# Patient Record
Sex: FEMALE | Race: WHITE | NOT HISPANIC OR LATINO | Employment: OTHER | URBAN - METROPOLITAN AREA
[De-identification: names, ages, dates, MRNs, and addresses within clinical notes are randomized per-mention and may not be internally consistent; named-entity substitution may affect disease eponyms.]

---

## 2017-11-06 ENCOUNTER — TELEPHONE (OUTPATIENT)
Dept: MEDICAL GROUP | Facility: MEDICAL CENTER | Age: 57
End: 2017-11-06

## 2017-11-06 ENCOUNTER — OFFICE VISIT (OUTPATIENT)
Dept: MEDICAL GROUP | Facility: MEDICAL CENTER | Age: 57
End: 2017-11-06
Payer: MEDICARE

## 2017-11-06 ENCOUNTER — HOSPITAL ENCOUNTER (OUTPATIENT)
Dept: RADIOLOGY | Facility: MEDICAL CENTER | Age: 57
End: 2017-11-06
Attending: NURSE PRACTITIONER
Payer: MEDICARE

## 2017-11-06 VITALS
DIASTOLIC BLOOD PRESSURE: 64 MMHG | TEMPERATURE: 98.1 F | HEIGHT: 67 IN | BODY MASS INDEX: 15.85 KG/M2 | OXYGEN SATURATION: 98 % | HEART RATE: 86 BPM | RESPIRATION RATE: 16 BRPM | WEIGHT: 101 LBS | SYSTOLIC BLOOD PRESSURE: 108 MMHG

## 2017-11-06 DIAGNOSIS — Z12.39 SCREENING FOR BREAST CANCER: ICD-10-CM

## 2017-11-06 DIAGNOSIS — R05.9 COUGH: ICD-10-CM

## 2017-11-06 DIAGNOSIS — M54.50 CHRONIC MIDLINE LOW BACK PAIN WITHOUT SCIATICA: ICD-10-CM

## 2017-11-06 DIAGNOSIS — R63.4 WEIGHT LOSS: ICD-10-CM

## 2017-11-06 DIAGNOSIS — Z72.0 TOBACCO ABUSE: ICD-10-CM

## 2017-11-06 DIAGNOSIS — Z90.710 S/P HYSTERECTOMY: ICD-10-CM

## 2017-11-06 DIAGNOSIS — Z12.11 SCREEN FOR COLON CANCER: ICD-10-CM

## 2017-11-06 DIAGNOSIS — R05.3 CHRONIC COUGH: ICD-10-CM

## 2017-11-06 DIAGNOSIS — G89.29 CHRONIC MIDLINE LOW BACK PAIN WITHOUT SCIATICA: ICD-10-CM

## 2017-11-06 PROCEDURE — 99203 OFFICE O/P NEW LOW 30 MIN: CPT | Performed by: NURSE PRACTITIONER

## 2017-11-06 PROCEDURE — 71020 DX-CHEST-2 VIEWS: CPT

## 2017-11-06 RX ORDER — ESTRADIOL 1 MG/1
1 TABLET ORAL DAILY
Qty: 30 TAB | Status: SHIPPED | DISCHARGE
Start: 2017-11-06 | End: 2018-11-21

## 2017-11-06 RX ORDER — IBUPROFEN 600 MG/1
600 TABLET ORAL DAILY
COMMUNITY

## 2017-11-06 RX ORDER — GABAPENTIN 300 MG/1
300 CAPSULE ORAL
Status: ON HOLD | DISCHARGE
Start: 2017-11-06 | End: 2021-03-05

## 2017-11-06 RX ORDER — TIZANIDINE HYDROCHLORIDE 2 MG/1
2 CAPSULE, GELATIN COATED ORAL NIGHTLY PRN
Status: ON HOLD | COMMUNITY
End: 2021-03-05

## 2017-11-06 ASSESSMENT — PATIENT HEALTH QUESTIONNAIRE - PHQ9: CLINICAL INTERPRETATION OF PHQ2 SCORE: 0

## 2017-11-06 ASSESSMENT — ENCOUNTER SYMPTOMS: WEIGHT LOSS: 1

## 2017-11-06 NOTE — PROGRESS NOTES
Subjective:      Cassandra Bartlett is a 57 y.o. female who presents with New Patient            HPI Cassandra Bartlett Is here today to establish care and for weight loss concerns.      1. Weight loss  Patient reports she has been low weight for all of her life and when she becomes stressed that she tends to lose more. Today in the office her BMI is 15 and she states she has lost about 10 pounds in the last 2 months. She thinks it is related to the stress of her sister recently passing away and taking care of her ill and elderly mother. She states she tends to eat very little when she is stressed. She has no particular area of concern but is a long time tobacco user.    2. Chronic midline low back pain without sciatica  Patient states she goes to pain management which is prescribing her muscle relaxers and low-dose narcotics for chronic back pain. She also occasionally gets epidural injections. She reports there has been no change in status and she does have titanium rods in her back from previous surgery.    3. S/P hysterectomy  Patient on hormone replacement through her gynecologist because of history of hysterectomy.    4. Cough  When questioned on cough patient states she has a mild cough which she relates secondary to her smoking. She denies hemoptysis, fever, sore throat or chills.    5. Screen for colon cancer  Patient believes it has been 10 years since her last colonoscopy.    6. Screening for breast cancer  It appears her last mammogram was 2012. She has not been going to a PCP in a while.    7. Tobacco abuse  Patient reports a 30 year history of pack-a-day smoking with occasional stopping in between.  Social History   Substance Use Topics   • Smoking status: Current Every Day Smoker     Packs/day: 1.00     Years: 30.00     Types: Cigarettes   • Smokeless tobacco: Never Used      Comment: 1 PPD   • Alcohol use No     Current Outpatient Prescriptions   Medication Sig Dispense Refill   • ibuprofen  "(MOTRIN) 600 MG Tab Take 600 mg by mouth as needed.     • tizanidine (ZANAFLEX) 2 MG capsule Take 2 mg by mouth as needed.     • estradiol (ESTRACE) 1 MG Tab Take 1 Tab by mouth every day. 30 Tab    • gabapentin (NEURONTIN) 300 MG Cap Take 1 Cap by mouth every bedtime.     • hydrocodone-acetaminophen (NORCO) 5-325 MG TABS per tablet Take 1-2 Tabs by mouth every four hours as needed.       No current facility-administered medications for this visit.    History reviewed. No pertinent past medical history.   Family History   Problem Relation Age of Onset   • Psychiatry Mother    • Hyperlipidemia Mother    • Cancer Father      asbestos exposure   • Cancer Sister        Review of Systems   Constitutional: Positive for weight loss.   All other systems reviewed and are negative.         Objective:     /64   Pulse 86   Temp 36.7 °C (98.1 °F)   Resp 16   Ht 1.702 m (5' 7\")   Wt 45.8 kg (101 lb)   SpO2 98%   BMI 15.82 kg/m²      Physical Exam   Constitutional: She is oriented to person, place, and time. She appears well-developed and well-nourished. No distress.   HENT:   Head: Normocephalic and atraumatic.   Right Ear: External ear normal.   Left Ear: External ear normal.   Nose: Nose normal.   Eyes: Right eye exhibits no discharge. Left eye exhibits no discharge.   Neck: Normal range of motion. Neck supple. No thyromegaly present.   Cardiovascular: Normal rate, regular rhythm and normal heart sounds.  Exam reveals no gallop and no friction rub.    No murmur heard.  Pulmonary/Chest: Effort normal and breath sounds normal. She has no wheezes. She has no rales.   Musculoskeletal: She exhibits no edema or tenderness.   Neurological: She is alert and oriented to person, place, and time. She displays normal reflexes.   Skin: Skin is warm and dry. No rash noted. She is not diaphoretic.   Psychiatric: She has a normal mood and affect. Her behavior is normal. Judgment and thought content normal.   Nursing note and " vitals reviewed.              Assessment/Plan:     1. Weight loss  Patient feels her weight loss is secondary to stress but I am concerned that her BMI is only at 15. I spoke with her about Ensure and other supplements as well as eating at least 3 good meals a day. She does not wish to go to counseling. I will do some basic lab work and a chest x-ray today. I would like her to have screening through the lung cancer screening program as well. I told her if the weight loss continues and she returns in 4 weeks she may need a CT of the chest, abdomen and pelvis.  - COMP METABOLIC PANEL; Future  - CBC WITH DIFFERENTIAL; Future  - TSH; Future    2. Chronic midline low back pain without sciatica  Patient currently goes to pain management and her medication list was updated and no medicines prescribed today.  - gabapentin (NEURONTIN) 300 MG Cap; Take 1 Cap by mouth every bedtime.    3. S/P hysterectomy  The medication below is coming through gynecology. She reports no history of DVT.  - estradiol (ESTRACE) 1 MG Tab; Take 1 Tab by mouth every day.  Dispense: 30 Tab    4. Cough  I would like to get a chest x-ray today and if it is normal C if she is eligible for CT scanning through the lung program.  - DX-CHEST-2 VIEWS; Future    5. Screen for colon cancer    - REFERRAL TO GI FOR COLONOSCOPY    6. Screening for breast cancer    - MA-SCREEN MAMMO W/CAD-BILAT; Future    7. Tobacco abuse    - REFERRAL TO LUNG CANCER SCREENING PROGRAM

## 2017-11-09 ENCOUNTER — TELEPHONE (OUTPATIENT)
Dept: HEMATOLOGY ONCOLOGY | Facility: MEDICAL CENTER | Age: 57
End: 2017-11-09

## 2017-11-09 NOTE — TELEPHONE ENCOUNTER
Received referral to lung cancer screening program.  Chart review to assess for lung cancer screening program eligibility.   1. Age 55-77 yrs of age? Yes 57 y.o.  2. 30 pack year hx of smoking, or greater? Yes 1 ppdx 30 yrs=  30 pkyr hx  3. Current smoker or if quit, has pt quit within last 15 yrs?Yes  Current smoker  4. Any signs or symptoms of lung cancer? None noted  5. Previous history of lung cancer? None noted  6. Chest CT within past 12 mos.? None noted  Patient does meet eligibility criteria. [facility] scheduling notified to schedule the shared decision making visit.

## 2018-01-03 ENCOUNTER — HOSPITAL ENCOUNTER (OUTPATIENT)
Dept: LAB | Facility: MEDICAL CENTER | Age: 58
End: 2018-01-03
Attending: NURSE PRACTITIONER
Payer: MEDICARE

## 2018-01-03 DIAGNOSIS — R63.4 WEIGHT LOSS: ICD-10-CM

## 2018-01-03 LAB
ALBUMIN SERPL BCP-MCNC: 3.7 G/DL (ref 3.2–4.9)
ALBUMIN/GLOB SERPL: 1.3 G/DL
ALP SERPL-CCNC: 54 U/L (ref 30–99)
ALT SERPL-CCNC: 9 U/L (ref 2–50)
ANION GAP SERPL CALC-SCNC: 3 MMOL/L (ref 0–11.9)
AST SERPL-CCNC: 17 U/L (ref 12–45)
BASOPHILS # BLD AUTO: 0.7 % (ref 0–1.8)
BASOPHILS # BLD: 0.06 K/UL (ref 0–0.12)
BILIRUB SERPL-MCNC: 0.4 MG/DL (ref 0.1–1.5)
BUN SERPL-MCNC: 12 MG/DL (ref 8–22)
CALCIUM SERPL-MCNC: 9 MG/DL (ref 8.5–10.5)
CHLORIDE SERPL-SCNC: 105 MMOL/L (ref 96–112)
CO2 SERPL-SCNC: 30 MMOL/L (ref 20–33)
CREAT SERPL-MCNC: 0.6 MG/DL (ref 0.5–1.4)
EOSINOPHIL # BLD AUTO: 0.11 K/UL (ref 0–0.51)
EOSINOPHIL NFR BLD: 1.3 % (ref 0–6.9)
ERYTHROCYTE [DISTWIDTH] IN BLOOD BY AUTOMATED COUNT: 43.7 FL (ref 35.9–50)
GFR SERPL CREATININE-BSD FRML MDRD: >60 ML/MIN/1.73 M 2
GLOBULIN SER CALC-MCNC: 2.9 G/DL (ref 1.9–3.5)
GLUCOSE SERPL-MCNC: 84 MG/DL (ref 65–99)
HCT VFR BLD AUTO: 45 % (ref 37–47)
HGB BLD-MCNC: 15 G/DL (ref 12–16)
IMM GRANULOCYTES # BLD AUTO: 0.05 K/UL (ref 0–0.11)
IMM GRANULOCYTES NFR BLD AUTO: 0.6 % (ref 0–0.9)
LYMPHOCYTES # BLD AUTO: 1.9 K/UL (ref 1–4.8)
LYMPHOCYTES NFR BLD: 22 % (ref 22–41)
MCH RBC QN AUTO: 29.8 PG (ref 27–33)
MCHC RBC AUTO-ENTMCNC: 33.3 G/DL (ref 33.6–35)
MCV RBC AUTO: 89.5 FL (ref 81.4–97.8)
MONOCYTES # BLD AUTO: 0.45 K/UL (ref 0–0.85)
MONOCYTES NFR BLD AUTO: 5.2 % (ref 0–13.4)
NEUTROPHILS # BLD AUTO: 6.05 K/UL (ref 2–7.15)
NEUTROPHILS NFR BLD: 70.2 % (ref 44–72)
NRBC # BLD AUTO: 0 K/UL
NRBC BLD-RTO: 0 /100 WBC
PLATELET # BLD AUTO: 243 K/UL (ref 164–446)
PMV BLD AUTO: 10.2 FL (ref 9–12.9)
POTASSIUM SERPL-SCNC: 4 MMOL/L (ref 3.6–5.5)
PROT SERPL-MCNC: 6.6 G/DL (ref 6–8.2)
RBC # BLD AUTO: 5.03 M/UL (ref 4.2–5.4)
SODIUM SERPL-SCNC: 138 MMOL/L (ref 135–145)
TSH SERPL DL<=0.005 MIU/L-ACNC: 0.98 UIU/ML (ref 0.38–5.33)
WBC # BLD AUTO: 8.6 K/UL (ref 4.8–10.8)

## 2018-01-03 PROCEDURE — 84443 ASSAY THYROID STIM HORMONE: CPT

## 2018-01-03 PROCEDURE — 80053 COMPREHEN METABOLIC PANEL: CPT

## 2018-01-03 PROCEDURE — 36415 COLL VENOUS BLD VENIPUNCTURE: CPT

## 2018-01-03 PROCEDURE — 85025 COMPLETE CBC W/AUTO DIFF WBC: CPT

## 2018-01-05 ENCOUNTER — OFFICE VISIT (OUTPATIENT)
Dept: MEDICAL GROUP | Facility: MEDICAL CENTER | Age: 58
End: 2018-01-05
Payer: MEDICARE

## 2018-01-05 VITALS
HEIGHT: 67 IN | HEART RATE: 119 BPM | WEIGHT: 101 LBS | BODY MASS INDEX: 15.85 KG/M2 | RESPIRATION RATE: 16 BRPM | SYSTOLIC BLOOD PRESSURE: 124 MMHG | OXYGEN SATURATION: 97 % | DIASTOLIC BLOOD PRESSURE: 66 MMHG | TEMPERATURE: 98.7 F

## 2018-01-05 DIAGNOSIS — J44.1 COPD EXACERBATION (HCC): ICD-10-CM

## 2018-01-05 DIAGNOSIS — Z72.0 TOBACCO ABUSE: ICD-10-CM

## 2018-01-05 DIAGNOSIS — J43.9 PULMONARY EMPHYSEMA, UNSPECIFIED EMPHYSEMA TYPE (HCC): ICD-10-CM

## 2018-01-05 PROCEDURE — 99214 OFFICE O/P EST MOD 30 MIN: CPT | Performed by: NURSE PRACTITIONER

## 2018-01-05 RX ORDER — AZITHROMYCIN 250 MG/1
TABLET, FILM COATED ORAL
Qty: 1 QUANTITY SUFFICIENT | Refills: 0 | Status: SHIPPED | OUTPATIENT
Start: 2018-01-05 | End: 2018-07-07

## 2018-01-05 RX ORDER — ALBUTEROL SULFATE 90 UG/1
2 AEROSOL, METERED RESPIRATORY (INHALATION) EVERY 6 HOURS PRN
Qty: 8.5 G | Refills: 11 | Status: SHIPPED | OUTPATIENT
Start: 2018-01-05 | End: 2019-05-21

## 2018-01-05 RX ORDER — METHYLPREDNISOLONE 4 MG/1
TABLET ORAL
Qty: 21 TAB | Refills: 0 | Status: SHIPPED | OUTPATIENT
Start: 2018-01-05 | End: 2018-07-07

## 2018-01-05 ASSESSMENT — ENCOUNTER SYMPTOMS
COUGH: 1
WHEEZING: 1

## 2018-01-05 NOTE — PROGRESS NOTES
Subjective:      Cassandra Bartlett is a 57 y.o. female who presents with Follow-Up (labs)    CC:Is here today to review lab work as well as new problem with cough and congestion.        HPI Cassandra Bartlett    1. COPD exacerbation (CMS-[facility])  Patient has possible COPD based on hyperinflation shown on chest x-rays but has never had pulmonary function studies. Her recent problem started about 1-1/2 weeks ago and everybody in her home has similar symptoms. She is complaining of cough, congestion, expectorating phlegm and wheezing. She does continue to smoke cigarettes. She denies recent fever, chills or myalgias. She did lab work just recently including chemistry panel, TSH and CBC which came back normal.    2. Pulmonary emphysema, unspecified emphysema type (CMS-[facility])  Patient has questionable history of COPD based on long-term smoking and chest x-ray report. She does smoke and does not have an inhaler to use. She would be willing to go see a pulmonologist.    3. Tobacco abuse  Patient did recently have work because of issues with weight loss although it does appear stable now. Her lab work is good and she states she has a colonoscopy in a few weeks. She also was supposed to get a CT of the lung for lung cancer screening but did not make an appointment.    Social History   Substance Use Topics   • Smoking status: Current Every Day Smoker     Packs/day: 1.00     Years: 30.00     Types: Cigarettes   • Smokeless tobacco: Never Used      Comment: 1 PPD   • Alcohol use No     Current Outpatient Prescriptions   Medication Sig Dispense Refill   • albuterol 108 (90 Base) MCG/ACT Aero Soln inhalation aerosol Inhale 2 Puffs by mouth every 6 hours as needed. 8.5 g 11   • azithromycin (ZITHROMAX Z-CASSANDRA) 250 MG Tab One Pack 1 Quantity Sufficient 0   • MethylPREDNISolone (MEDROL DOSEPAK) 4 MG Tablet Therapy Pack As directed on the packaging label. 21 Tab 0   • ibuprofen (MOTRIN) 600 MG Tab Take 600 mg by mouth as needed.     •  "tizanidine (ZANAFLEX) 2 MG capsule Take 2 mg by mouth as needed.     • estradiol (ESTRACE) 1 MG Tab Take 1 Tab by mouth every day. 30 Tab    • gabapentin (NEURONTIN) 300 MG Cap Take 1 Cap by mouth every bedtime.     • hydrocodone-acetaminophen (NORCO) 5-325 MG TABS per tablet Take 1-2 Tabs by mouth every four hours as needed.       No current facility-administered medications for this visit.      Family History   Problem Relation Age of Onset   • Psychiatry Mother    • Hyperlipidemia Mother    • Cancer Father      asbestos exposure   • Cancer Sister    History reviewed. No pertinent past medical history.    Review of Systems   HENT: Positive for congestion.    Respiratory: Positive for cough and wheezing.    All other systems reviewed and are negative.         Objective:     /66   Pulse (!) 119   Temp 37.1 °C (98.7 °F)   Resp 16   Ht 1.702 m (5' 7\")   Wt 45.8 kg (101 lb)   SpO2 97%   BMI 15.82 kg/m²      Physical Exam   Constitutional: She is oriented to person, place, and time. She appears well-developed and well-nourished. No distress.   HENT:   Head: Normocephalic and atraumatic.   Right Ear: External ear normal.   Left Ear: External ear normal.   Nose: Nose normal.   Eyes: Right eye exhibits no discharge. Left eye exhibits no discharge.   Neck: Normal range of motion. Neck supple. No thyromegaly present.   Cardiovascular: Normal rate, regular rhythm and normal heart sounds.  Exam reveals no gallop and no friction rub.    No murmur heard.  Pulmonary/Chest: Effort normal. She has wheezes. She has rhonchi. She has no rales.   Musculoskeletal: She exhibits no edema or tenderness.   Neurological: She is alert and oriented to person, place, and time. She displays normal reflexes.   Skin: Skin is warm and dry. No rash noted. She is not diaphoretic.   Psychiatric: She has a normal mood and affect. Her behavior is normal. Judgment and thought content normal.   Nursing note and vitals reviewed.       "   Component      Latest Ref Rng & Units 1/3/2018 1/3/2018 1/3/2018 1/3/2018           7:45 AM  7:45 AM  7:45 AM  7:45 AM   WBC      4.8 - 10.8 K/uL  8.6     RBC      4.20 - 5.40 M/uL  5.03     Hemoglobin      12.0 - 16.0 g/dL  15.0     Hematocrit      37.0 - 47.0 %  45.0     MCV      81.4 - 97.8 fL  89.5     MCH      27.0 - 33.0 pg  29.8     MCHC      33.6 - 35.0 g/dL  33.3 (L)     RDW      35.9 - 50.0 fL  43.7     Platelet Count      164 - 446 K/uL  243     MPV      9.0 - 12.9 fL  10.2     Neutrophils-Polys      44.00 - 72.00 %  70.20     Lymphocytes      22.00 - 41.00 %  22.00     Monocytes      0.00 - 13.40 %  5.20     Eosinophils      0.00 - 6.90 %  1.30     Basophils      0.00 - 1.80 %  0.70     Immature Granulocytes      0.00 - 0.90 %  0.60     Nucleated RBC      /100 WBC  0.00     Neutrophils (Absolute)      2.00 - 7.15 K/uL  6.05     Lymphs (Absolute)      1.00 - 4.80 K/uL  1.90     Monos (Absolute)      0.00 - 0.85 K/uL  0.45     Eos (Absolute)      0.00 - 0.51 K/uL  0.11     Baso (Absolute)      0.00 - 0.12 K/uL  0.06     Immature Granulocytes (abs)      0.00 - 0.11 K/uL  0.05     NRBC (Absolute)      K/uL  0.00     Sodium      135 - 145 mmol/L   138    Potassium      3.6 - 5.5 mmol/L   4.0    Chloride      96 - 112 mmol/L   105    Co2      20 - 33 mmol/L   30    Anion Gap      0.0 - 11.9   3.0    Glucose      65 - 99 mg/dL   84    Bun      8 - 22 mg/dL   12    Creatinine      0.50 - 1.40 mg/dL   0.60    Calcium      8.5 - 10.5 mg/dL   9.0    AST(SGOT)      12 - 45 U/L   17    ALT(SGPT)      2 - 50 U/L   9    Alkaline Phosphatase      30 - 99 U/L   54    Total Bilirubin      0.1 - 1.5 mg/dL   0.4    Albumin      3.2 - 4.9 g/dL   3.7    Total Protein      6.0 - 8.2 g/dL   6.6    Globulin      1.9 - 3.5 g/dL   2.9    A-G Ratio      g/dL   1.3    GFR If African American      >60 mL/min/1.73 m 2    >60   GFR If Non African American      >60 mL/min/1.73 m 2    >60   TSH      0.380 - 5.330 uIU/mL 0.980            Assessment/Plan:     1. COPD exacerbation (CMS-[facility])  Patient will start on a Z-Cassandra as well as a Medrol Dosepak. I also will give her albuterol to use for her wheezing. She is to follow back in the next week if no improvement for chest x-ray. She also is pending a lung CT.  - azithromycin (ZITHROMAX Z-CASSANDRA) 250 MG Tab; One Pack  Dispense: 1 Quantity Sufficient; Refill: 0  - MethylPREDNISolone (MEDROL DOSEPAK) 4 MG Tablet Therapy Pack; As directed on the packaging label.  Dispense: 21 Tab; Refill: 0    2. Pulmonary emphysema, unspecified emphysema type (CMS-HCC)  Patient with possible COPD and needs pulmonary function testing and referral to pulmonology place today. I will give her albuterol to try to see if this helps with symptoms. She has never been on a preventative inhaler but I would like to see a PFT first.   - REFERRAL TO PULMONOLOGY  - albuterol 108 (90 Base) MCG/ACT Aero Soln inhalation aerosol; Inhale 2 Puffs by mouth every 6 hours as needed.  Dispense: 8.5 g; Refill: 11    3. Tobacco abuse  I showed patient that she had been referred for low radiation lung CT because of her long history of smoking and she needs to contact the program to set up for her CT. I reviewed with her her lab work and she will do her colonoscopy in 2 weeks.  - REFERRAL TO PULMONOLOGY

## 2018-01-08 ENCOUNTER — OFFICE VISIT (OUTPATIENT)
Dept: HEMATOLOGY ONCOLOGY | Facility: MEDICAL CENTER | Age: 58
End: 2018-01-08
Payer: MEDICARE

## 2018-01-08 VITALS
BODY MASS INDEX: 16.51 KG/M2 | HEART RATE: 89 BPM | HEIGHT: 67 IN | TEMPERATURE: 99.1 F | OXYGEN SATURATION: 94 % | RESPIRATION RATE: 16 BRPM | WEIGHT: 105.16 LBS | SYSTOLIC BLOOD PRESSURE: 108 MMHG | DIASTOLIC BLOOD PRESSURE: 72 MMHG

## 2018-01-08 DIAGNOSIS — F17.210 CIGARETTE SMOKER: ICD-10-CM

## 2018-01-08 PROCEDURE — G0296 VISIT TO DETERM LDCT ELIG: HCPCS | Performed by: NURSE PRACTITIONER

## 2018-01-08 ASSESSMENT — ENCOUNTER SYMPTOMS
WHEEZING: 1
SHORTNESS OF BREATH: 0
HEMOPTYSIS: 0
WEIGHT LOSS: 1
COUGH: 1
SPUTUM PRODUCTION: 0

## 2018-01-08 ASSESSMENT — PAIN SCALES - GENERAL: PAINLEVEL: NO PAIN

## 2018-01-08 NOTE — PROGRESS NOTES
Subjective:      Cassandra Bartlett is a 57 y.o. female who presents for Lung Cancer Screening Program Prescreen (Nicotine dependence. Ref: Kimo Thacker) for lung cancer screening shared decision making visit.         HPI    Patient seen today for initial lung cancer screening visit. Patient referred by her PCP MICHA Busby.     The patient meets eligibility criteria including age, smoking history (30+ pack years), if former smoker, quit in the last 15 years, and absence of signs or symptoms of lung cancer.    - Age - 57  - Smoking history - Patient has smoked for 40 years at an average of 1 ppd = 40 pack year smoking history.  - Current smoking status - Current smoker. She is currently attempting to quit and is down to 1/2 ppd. However, she does get stressed easily and is having a harder time to quit.   - No symptoms of lung cancer and no previous history of lung cancer     Referral to pulmonary has been placed by PCP.     Allergies   Allergen Reactions   • Codeine      Current Outpatient Prescriptions on File Prior to Visit   Medication Sig Dispense Refill   • azithromycin (ZITHROMAX Z-CASSANDRA) 250 MG Tab One Pack 1 Quantity Sufficient 0   • albuterol 108 (90 Base) MCG/ACT Aero Soln inhalation aerosol Inhale 2 Puffs by mouth every 6 hours as needed. 8.5 g 11   • MethylPREDNISolone (MEDROL DOSEPAK) 4 MG Tablet Therapy Pack As directed on the packaging label. 21 Tab 0   • ibuprofen (MOTRIN) 600 MG Tab Take 600 mg by mouth as needed.     • tizanidine (ZANAFLEX) 2 MG capsule Take 2 mg by mouth as needed.     • estradiol (ESTRACE) 1 MG Tab Take 1 Tab by mouth every day. 30 Tab    • gabapentin (NEURONTIN) 300 MG Cap Take 1 Cap by mouth every bedtime.     • hydrocodone-acetaminophen (NORCO) 5-325 MG TABS per tablet Take 1-2 Tabs by mouth every four hours as needed.       No current facility-administered medications on file prior to visit.        Review of Systems   Constitutional: Positive for weight loss (a lot of  "stress and caring for sick mother. Attempting to gain weight. ). Negative for malaise/fatigue.   Respiratory: Positive for cough (residual cold with coughing. Otherwise coughing every once in a while. ) and wheezing (noticed recent wheezing by PCP from acute cold). Negative for hemoptysis, sputum production and shortness of breath (only with recent cold).         Recent cold and started on Z-stefany          Objective:     /72   Pulse 89   Temp 37.3 °C (99.1 °F)   Resp 16   Ht 1.702 m (5' 7\")   Wt 47.7 kg (105 lb 2.6 oz)   SpO2 94%   Breastfeeding? No   BMI 16.47 kg/m²      Physical Exam   Constitutional: She is oriented to person, place, and time. She appears well-developed and well-nourished. No distress.   HENT:   Head: Normocephalic and atraumatic.   Cardiovascular: Normal rate, regular rhythm and normal heart sounds.  Exam reveals no gallop and no friction rub.    No murmur heard.  Pulmonary/Chest: Effort normal. No respiratory distress. She has wheezes.   Inspiratory wheezing noted    Musculoskeletal: Normal range of motion.   Neurological: She is alert and oriented to person, place, and time.   Skin: Skin is warm and dry. She is not diaphoretic.   Vitals reviewed.              Assessment/Plan:     1. Cigarette smoker  CT-LUNG CANCER-SCREENING         We conducted a shared decision-making process using a decision aid. We reviewed benefits and harms of screening, including false positives and potential need for additional diagnostic testing, the possibility of over diagnosis, and total radiation exposure.    We discussed the importance of adhering to annual LDCT screening. We also discussed the impact of comorbities on the patient's the ability or willingness to undergo diagnostic procedure(s) and treatment.    Counseling on the importance of maintaining cigarette smoking abstinence if former smoker; or the importance of smoking cessation if current smoker and, if appropriate, furnishing of " information about tobacco cessation interventions. I provided patient with smoking cessation materials and resources within [facility] and the community. Patient appreciative of the resources.       Based on our discussion, we have decided to begin annual lung cancer screening starting now.

## 2018-01-09 ENCOUNTER — HOSPITAL ENCOUNTER (OUTPATIENT)
Dept: RADIOLOGY | Facility: MEDICAL CENTER | Age: 58
End: 2018-01-09
Attending: NURSE PRACTITIONER
Payer: MEDICARE

## 2018-01-09 DIAGNOSIS — F17.210 CIGARETTE SMOKER: ICD-10-CM

## 2018-01-09 PROCEDURE — G0297 LDCT FOR LUNG CA SCREEN: HCPCS

## 2018-01-12 ENCOUNTER — TELEPHONE (OUTPATIENT)
Dept: HEMATOLOGY ONCOLOGY | Facility: MEDICAL CENTER | Age: 58
End: 2018-01-12

## 2018-01-12 DIAGNOSIS — R91.8 LUNG NODULES: ICD-10-CM

## 2018-01-12 NOTE — TELEPHONE ENCOUNTER
"Phoned patient with results of LDCT exam performed on 1/9/2018 .  Notified her that the results showed \"multiple bilateral pulmonary nodules measuring up to 6mm with both solid and part solid nodules present.\" Informed her they are very small nodules that had a benign, or non cancer, appearance, however Ariadna Ramsey, APRN, would like to refer her to the lung nodule clinic to keep a closer eye on her.  Informed her the nodule clinic can determine if annual is appropriate or if repeat CT will be needed sooner.  Informed of incidental findings of \"bilateral apical pleural scarring and minimal bilateral dependent atelectatic changes.\" Provided pt education that she does not have any emergent findings.  Informed her that the pulmonary group will review these findings with her in detail when they follow up with her.  Patient agrees to all recommendations. Notified GARRY Busby, of results and recommendations via inbasket message.  Health maintenance updated and patient sent lung cancer screening result letter.    "

## 2018-02-13 ENCOUNTER — OFFICE VISIT (OUTPATIENT)
Dept: PULMONOLOGY | Facility: HOSPICE | Age: 58
End: 2018-02-13
Payer: MEDICARE

## 2018-02-13 VITALS
DIASTOLIC BLOOD PRESSURE: 72 MMHG | WEIGHT: 105 LBS | TEMPERATURE: 97.5 F | HEIGHT: 67 IN | RESPIRATION RATE: 16 BRPM | HEART RATE: 81 BPM | BODY MASS INDEX: 16.48 KG/M2 | OXYGEN SATURATION: 95 % | SYSTOLIC BLOOD PRESSURE: 122 MMHG

## 2018-02-13 DIAGNOSIS — R91.8 PULMONARY NODULES: ICD-10-CM

## 2018-02-13 DIAGNOSIS — Z72.0 TOBACCO USE: ICD-10-CM

## 2018-02-13 PROCEDURE — 99204 OFFICE O/P NEW MOD 45 MIN: CPT | Performed by: INTERNAL MEDICINE

## 2018-02-13 NOTE — PROGRESS NOTES
Chief Complaint   Patient presents with   • Establish Care     Referred by SHANNA Diehl for CT results       HPI: This patient is a 57 y.o. Female who is referred to lung nodule clinic for lung cancer screening CAT scan results. She has an estimated 40-pack-year history of tobacco use, currently smoking half a pack daily. She is motivated to quit smoking however admits to increased stressors at home which make it difficult for her to quit entirely. She has cut down from one pack daily. She had lost 15 pounds over the past year which she feels is related to her life stressors. She had a low dose lung cancer screening chest CAT scan on January 9, 2018 which was personally reviewed. This demonstrated scattered, bilateral , 5mm noncalcified pulmonary nodules. No infiltrates or lymphadenopathy appreciated. She denies dyspnea, cough, wheezing, hemoptysis or chest pain. She denies a personal history of malignancy. Denies history of pneumonia. She has had acute bronchitis and possible COPD, although formal diagnosis has not been made of COPD to date.    Past Medical History:   Diagnosis Date   • Back pain    • Chickenpox        Social History     Social History   • Marital status:      Spouse name: N/A   • Number of children: N/A   • Years of education: N/A     Occupational History   • Not on file.     Social History Main Topics   • Smoking status: Current Every Day Smoker     Packs/day: 1.00     Years: 40.00     Types: Cigarettes   • Smokeless tobacco: Never Used      Comment: 1 PPD   • Alcohol use No   • Drug use: No   • Sexual activity: Yes     Other Topics Concern   • Not on file     Social History Narrative   • No narrative on file       Family History   Problem Relation Age of Onset   • Psychiatry Mother    • Hyperlipidemia Mother    • Cancer Father      asbestos exposure   • Cancer Sister        Current Outpatient Prescriptions on File Prior to Visit   Medication Sig Dispense Refill   • ibuprofen  "(MOTRIN) 600 MG Tab Take 600 mg by mouth as needed.     • tizanidine (ZANAFLEX) 2 MG capsule Take 2 mg by mouth as needed.     • estradiol (ESTRACE) 1 MG Tab Take 1 Tab by mouth every day. 30 Tab    • gabapentin (NEURONTIN) 300 MG Cap Take 1 Cap by mouth every bedtime.     • hydrocodone-acetaminophen (NORCO) 5-325 MG TABS per tablet Take 1-2 Tabs by mouth every four hours as needed.     • albuterol 108 (90 Base) MCG/ACT Aero Soln inhalation aerosol Inhale 2 Puffs by mouth every 6 hours as needed. 8.5 g 11   • azithromycin (ZITHROMAX Z-CASSANDRA) 250 MG Tab One Pack 1 Quantity Sufficient 0   • MethylPREDNISolone (MEDROL DOSEPAK) 4 MG Tablet Therapy Pack As directed on the packaging label. 21 Tab 0     No current facility-administered medications on file prior to visit.        Allergies: Codeine    ROS:   Constitutional: Denies fevers, chills, night sweats, fatigue,+weight loss  Eyes: Denies vision loss, pain, drainage, double vision  Ears, Nose, Throat: Denies earache, difficulty hearing, tinnitus, nasal congestion, hoarseness  Cardiovascular: Denies chest pain, tightness, palpitations, orthopnea or edema  Respiratory: Denies shortness of breath, cough, wheezing, hemoptysis  Sleep: Denies daytime sleepiness, snoring, apneas, insomnia, morning headaches  GI: Denies heartburn, dysphagia, nausea, abdominal pain, diarrhea or constipation  : Denies frequent urination, hematuria, discharge or painful urination  Musculoskeletal: Denies back pain, painful joints, sore muscles  Neurological: Denies weakness or headaches  Skin: No rashes    Blood pressure 122/72, pulse 81, temperature 36.4 °C (97.5 °F), resp. rate 16, height 1.702 m (5' 7\"), weight 47.6 kg (105 lb), SpO2 95 %.    Physical Exam:  Appearance: Well-nourished, well-developed, thin, in no acute distress  HEENT: Normocephalic, atraumatic, white sclera, PERRLA, oropharynx clear  Neck: No adenopathy or masses  Respiratory: no intercostal retractions or accessory muscle " use  Lungs auscultation: Clear to auscultation bilaterally  Cardiovascular: Regular rate rhythm. No murmurs, rubs or gallops.  No LE edema  Abdomen: soft, nondistended  Gait: Normal  Digits: No clubbing, cyanosis  Motor: No focal deficits  Orientation: Oriented to time, person and place    Diagnosis:  1. Pulmonary nodules  CT-CHEST (THORAX) W/O    AMB PULMONARY FUNCTION TEST/LAB   2. Tobacco use         Plan:  The patient's lung cancer screening CAT scan shows scattered, multiple <5mm pulmonary nodules. She is an active smoker at half a pack daily with motivation to quit. She denies any respiratory symptomatology although has dropped weight.  Smoking cessation counseling was provided. We discussed that the nodules are likely postinflammatory, however as smoking is the #1 risk factor for lung cancer, ongoing surveillance of the chest CAT scan is recommended to confirm stability.   She was amenable to surveillance and will have a follow-up chest CAT scan without contrast in 6 months.  Obtain full pulmonary function testing with [facility] for screening COPD.  Return in about 6 months (around 8/13/2018) for with PFT, after CT scan, at lung nodule clinic.

## 2018-04-24 NOTE — TELEPHONE ENCOUNTER
----- Message from GARRY Carlos sent at 11/6/2017  9:43 AM PST -----  Advise patient that chest x-ray showed probable emphysema but no masses. Continue plan to have CT of lung if covered by insurance as well as follow-up here in 4 weeks.  
Pt notified.    
[id number] Detailed

## 2018-05-31 ENCOUNTER — PATIENT OUTREACH (OUTPATIENT)
Dept: HEALTH INFORMATION MANAGEMENT | Facility: OTHER | Age: 58
End: 2018-05-31

## 2018-05-31 NOTE — PROGRESS NOTES
Outcome: call back    Please transfer to Patient Outreach Team at [phone] when patient returns call.        Attempt # 1

## 2018-07-07 ENCOUNTER — HOSPITAL ENCOUNTER (OUTPATIENT)
Dept: RADIOLOGY | Facility: MEDICAL CENTER | Age: 58
End: 2018-07-07
Attending: PHYSICIAN ASSISTANT
Payer: MEDICARE

## 2018-07-07 ENCOUNTER — OFFICE VISIT (OUTPATIENT)
Dept: URGENT CARE | Facility: PHYSICIAN GROUP | Age: 58
End: 2018-07-07
Payer: MEDICARE

## 2018-07-07 VITALS
WEIGHT: 102 LBS | RESPIRATION RATE: 16 BRPM | TEMPERATURE: 98 F | OXYGEN SATURATION: 96 % | BODY MASS INDEX: 16.01 KG/M2 | HEIGHT: 67 IN | HEART RATE: 87 BPM | SYSTOLIC BLOOD PRESSURE: 102 MMHG | DIASTOLIC BLOOD PRESSURE: 74 MMHG

## 2018-07-07 DIAGNOSIS — R07.9 RIGHT-SIDED CHEST PAIN: ICD-10-CM

## 2018-07-07 DIAGNOSIS — J18.9 PNEUMONIA OF RIGHT LOWER LOBE DUE TO INFECTIOUS ORGANISM: ICD-10-CM

## 2018-07-07 PROCEDURE — 99204 OFFICE O/P NEW MOD 45 MIN: CPT | Performed by: PHYSICIAN ASSISTANT

## 2018-07-07 PROCEDURE — 700117 HCHG RX CONTRAST REV CODE 255: Performed by: PHYSICIAN ASSISTANT

## 2018-07-07 PROCEDURE — 71275 CT ANGIOGRAPHY CHEST: CPT

## 2018-07-07 RX ORDER — AZITHROMYCIN 250 MG/1
TABLET, FILM COATED ORAL
Qty: 6 TAB | Refills: 1 | Status: SHIPPED | OUTPATIENT
Start: 2018-07-07 | End: 2018-11-21

## 2018-07-07 RX ADMIN — IOHEXOL 100 ML: 350 INJECTION, SOLUTION INTRAVENOUS at 10:41

## 2018-07-07 ASSESSMENT — ENCOUNTER SYMPTOMS
GASTROINTESTINAL NEGATIVE: 1
HEMOPTYSIS: 0
COUGH: 1
MYALGIAS: 1
SHORTNESS OF BREATH: 1
BACK PAIN: 1
NEUROLOGICAL NEGATIVE: 1
WHEEZING: 0
EYES NEGATIVE: 1
SPUTUM PRODUCTION: 1

## 2018-07-07 NOTE — PROGRESS NOTES
Subjective:      Cassandra Bartlett is a 58 y.o. female who presents with Cough (right side back pain with movement and breathing )       Cough   Associated symptoms include myalgias and shortness of breath. Pertinent negatives include no hemoptysis or wheezing.   Patient presents today for about 3 days of worsening right sided chest wall pain. She did not injure herself but did sleep in a different bed the morning she woke up with the pain.    Patient states she has been having a cough as well as body aches.   The cough is a mix of both dry and wet. No hemoptysis.  She felt slightly feverish last night but no confirmed temp.    No leg pain or swelling.  She does report that she was around sick contacts and she also states she was in a car driving back from [place] that involved 18 hours of driving.   She has not taken anything for the symptoms.  She does smoke and does take oral HRT.   She has hx of diagnosed emphysema.     Review of Systems   Constitutional:        SEE HPI   HENT: Positive for congestion.    Eyes: Negative.    Respiratory: Positive for cough, sputum production and shortness of breath. Negative for hemoptysis and wheezing.    Cardiovascular:        LION HPI   Gastrointestinal: Negative.    Musculoskeletal: Positive for back pain and myalgias.   Skin: Negative.    Neurological: Negative.    Endo/Heme/Allergies: Negative.    All other systems reviewed and are negative.       PMH:  has a past medical history of Back pain and Chickenpox. She also has no past medical history of ASTHMA; Breast cancer (HCC); or Diabetes.  MEDS:   Current Outpatient Prescriptions:   •  azithromycin (ZITHROMAX) 250 MG Tab, Take as directed, Disp: 6 Tab, Rfl: 1  •  tizanidine (ZANAFLEX) 2 MG capsule, Take 2 mg by mouth as needed., Disp: , Rfl:   •  estradiol (ESTRACE) 1 MG Tab, Take 1 Tab by mouth every day., Disp: 30 Tab, Rfl:   •  gabapentin (NEURONTIN) 300 MG Cap, Take 1 Cap by mouth every bedtime., Disp: , Rfl:   •   "albuterol 108 (90 Base) MCG/ACT Aero Soln inhalation aerosol, Inhale 2 Puffs by mouth every 6 hours as needed., Disp: 8.5 g, Rfl: 11  •  ibuprofen (MOTRIN) 600 MG Tab, Take 600 mg by mouth as needed., Disp: , Rfl:   ALLERGIES:   Allergies   Allergen Reactions   • Codeine      SURGHX:   Past Surgical History:   Procedure Laterality Date   • ARTHROSCOPY, KNEE     • GYN SURGERY      hysto   • LAMINOTOMY     • OTHER      knee surgery   • PB ENLARGE BREAST WITH IMPLANT       SOCHX:  reports that she has been smoking Cigarettes.  She has a 40.00 pack-year smoking history. She has never used smokeless tobacco. She reports that she does not drink alcohol or use drugs.  FH: Family history was reviewed, no pertinent findings to report   Objective:     /74   Pulse 87   Temp 36.7 °C (98 °F)   Resp 16   Ht 1.702 m (5' 7\")   Wt 46.3 kg (102 lb)   SpO2 96%   BMI 15.98 kg/m²      Physical Exam   Constitutional: She is oriented to person, place, and time. She appears well-developed and well-nourished. No distress.   HENT:   Head: Normocephalic and atraumatic.   Nose: Nose normal.   Mouth/Throat: Oropharynx is clear and moist. No oropharyngeal exudate.   Eyes: Conjunctivae are normal.   Neck: Normal range of motion. Neck supple.   Cardiovascular: Normal rate and regular rhythm.    Pulmonary/Chest: Effort normal. No respiratory distress. She has no wheezes. She has rales (right). She exhibits tenderness (mild right lower/lateral).   Musculoskeletal: Normal range of motion.   Lymphadenopathy:     She has no cervical adenopathy.   Neurological: She is alert and oriented to person, place, and time.   Skin: Skin is warm and dry. No rash noted.   Psychiatric: She has a normal mood and affect. Her behavior is normal.   Vitals reviewed.       RAD    Impression       1.  There is no CT evidence of acute pulmonary embolism.  2.  There are patchy groundglass nodular opacities consistent with multifocal pneumonitis in the right " lower lobe.  3.  There is linear opacity with bronchiectasis in the medial right middle lobe, probably postinflammatory scarring.  4.  There are small bilateral subpleural nodules which are probably postinflammatory ranging between 2 and 4 mm in size.  5.  There is underlying emphysema with probable biapical pleural-parenchymal scarring.    Low Risk: No routine follow-up    High Risk: Optional CT at 12 months    Comments: Use most suspicious nodule as guide to management. Follow-up intervals may vary according to size and risk.    Low Risk - Minimal or absent history of smoking and of other known risk factors.    High Risk - History of smoking or of other known risk factors.    Note: These recommendations do not apply to lung cancer screening, patients with immunosuppression, or patients with known primary cancer.    Fleischner Society 2017 Guidelines for Management of Incidentally Detected Pulmonary Nodules in Adults   Reading Provider Reading Date   Lakeshia Hardy M.D. Jul 7, 2018        Assessment/Plan:     1. Pneumonia of right lower lobe due to infectious organism (HCC)  CT-CTA CHEST PULMONARY ARTERY W/ RECONS    azithromycin (ZITHROMAX) 250 MG Tab         -CT as above.  No evidence of PE.   Patient is afebrile, will cover as above at this time.    -recommend use of inhaler prn, lung care, PCP and Pulmonology follow up.   -did discuss smoking cessation  -Strict ER/RTC precautions for new or worsening SOB, chest discomfort, fevers etc.       Supportive care, differential diagnoses, and indications for immediate follow-up discussed with patient.   Pathogenesis of diagnosis discussed including typical length and natural progression.   Instructed to return to clinic or nearest emergency department for any change in condition, further concerns, or worsening of symptoms.  Patient states understanding of the plan of care and discharge instructions.        Betty Carrera P.A.-C.

## 2018-07-11 ENCOUNTER — TELEPHONE (OUTPATIENT)
Dept: MEDICAL GROUP | Facility: MEDICAL CENTER | Age: 58
End: 2018-07-11

## 2018-07-11 RX ORDER — CEFDINIR 300 MG/1
300 CAPSULE ORAL 2 TIMES DAILY
Qty: 20 CAP | Refills: 0 | Status: SHIPPED | OUTPATIENT
Start: 2018-07-11 | End: 2018-07-21

## 2018-07-11 NOTE — TELEPHONE ENCOUNTER
Advise patient to finish Zithromax prescribed at urgent care and I have added Omnicef antibiotic twice a day for 10 days which was sent to her pharmacy. Pneumonia symptoms can take 3-4 weeks to fully resolve.

## 2018-07-11 NOTE — TELEPHONE ENCOUNTER
1. Caller Name: Cassandra Bartlett                        Call Back Number: [phone] (home)       2. Message: Patient called, she states she was seen for pneumonia and was given antibiotic but she feels like she is not feeling any better and would like to know if you could please send a new antibiotic to the pharmacy? thank you     3. Patient approves office to leave a detailed voicemail/MyChart message: N\A

## 2018-07-12 ENCOUNTER — TELEPHONE (OUTPATIENT)
Dept: MEDICAL GROUP | Facility: MEDICAL CENTER | Age: 58
End: 2018-07-12

## 2018-07-12 NOTE — TELEPHONE ENCOUNTER
1. Caller Name: Cassandra Bartlett                       Call Back Number: [phone] (home)       2. Message: called to let you know that she started taking the new antibiotic that you sent to the pharmacy and she broke out in hives.. I suggested her to be seen in urgent care but she said she will take something over the counter and if she has more issues she will be seen    3. Patient approves office to leave a detailed voicemail/MyChart message: N\A

## 2018-08-13 ENCOUNTER — APPOINTMENT (OUTPATIENT)
Dept: PULMONOLOGY | Facility: HOSPICE | Age: 58
End: 2018-08-13
Payer: MEDICARE

## 2018-10-18 ENCOUNTER — PATIENT OUTREACH (OUTPATIENT)
Dept: HEALTH INFORMATION MANAGEMENT | Facility: OTHER | Age: 58
End: 2018-10-18

## 2018-10-18 NOTE — PROGRESS NOTES
Outcome: Left Message    Please transfer to Patient Outreach Team at [phone] when patient returns call.    WebIZ Checked & Epic Updated:  yes    HealthConnect Verified: yes    Attempt # 1

## 2018-10-31 NOTE — PROGRESS NOTES
1. Attempt #:2    2. HealthConnect Verified: no    3. Verify PCP: yes    4. Review Care Team: no    5. WebIZ Checked & Epic Updated: Yes  · WebIZ Recommendations: FLU  · Is patient due for Tdap? NO  · Is patient due for Shingles? YES, OUT OF STOCK    6. Reviewed/Updated the following with patient:       •   Communication Preference Obtained? YES       •   Preferred Pharmacy? YES       •   Preferred Lab? YES       •   Family History (document living status of immediate family members and if + hx of cancer, diabetes, hypertension, hyperlipidemia, heart attack, stroke) NO WILL DO WITH PCP    7. Annual Wellness Visit Scheduling  · Scheduling Status:Scheduled     8. Care Gap Scheduling (Attempt to Schedule EACH Overdue Care Gap!)     Health Maintenance Due   Topic Date Due   • Annual Wellness Visit  1960   • IMM DTaP/Tdap/Td Vaccine (1 - Tdap) 05/04/1978   • IMM PNEUMOCOCCAL 19-64 (ADULT) MEDIUM RISK SERIES (1 of 1 - PPSV23) 05/04/1978   • PAP SMEAR  05/04/1980   • IMM ZOSTER VACCINES (1 of 2) 05/04/2009   • MAMMOGRAM  01/13/2013        Scheduled patient for Annual Wellness Visit and Mammogram     9. Motribe Activation: declined    10. Motribe Hillary: no    11. Virtual Visits: no    12. Opt In to Text Messages: no    13. Patient was advised: “This is a free wellness visit. The provider will screen for medical conditions to help you stay healthy. If you have other concerns to address you may be asked to discuss these at a separate visit or there may be an additional fee.”     14. Patient was informed to arrive 15 min prior to their scheduled appointment and bring in their medication bottles.

## 2018-11-14 ENCOUNTER — HOSPITAL ENCOUNTER (OUTPATIENT)
Dept: RADIOLOGY | Facility: MEDICAL CENTER | Age: 58
End: 2018-11-14
Attending: NURSE PRACTITIONER
Payer: MEDICARE

## 2018-11-14 DIAGNOSIS — Z12.31 VISIT FOR SCREENING MAMMOGRAM: ICD-10-CM

## 2018-11-14 PROCEDURE — 77067 SCR MAMMO BI INCL CAD: CPT

## 2018-11-21 ENCOUNTER — OFFICE VISIT (OUTPATIENT)
Dept: MEDICAL GROUP | Facility: MEDICAL CENTER | Age: 58
End: 2018-11-21
Payer: MEDICARE

## 2018-11-21 VITALS
BODY MASS INDEX: 16.26 KG/M2 | HEART RATE: 75 BPM | SYSTOLIC BLOOD PRESSURE: 100 MMHG | OXYGEN SATURATION: 95 % | TEMPERATURE: 96.7 F | WEIGHT: 103.62 LBS | DIASTOLIC BLOOD PRESSURE: 60 MMHG | HEIGHT: 67 IN

## 2018-11-21 DIAGNOSIS — Z00.00 MEDICARE ANNUAL WELLNESS VISIT, SUBSEQUENT: ICD-10-CM

## 2018-11-21 DIAGNOSIS — Z23 NEED FOR VACCINATION: ICD-10-CM

## 2018-11-21 DIAGNOSIS — M54.50 CHRONIC MIDLINE LOW BACK PAIN WITHOUT SCIATICA: ICD-10-CM

## 2018-11-21 DIAGNOSIS — R91.8 PULMONARY NODULES: ICD-10-CM

## 2018-11-21 DIAGNOSIS — G89.29 CHRONIC MIDLINE LOW BACK PAIN WITHOUT SCIATICA: ICD-10-CM

## 2018-11-21 DIAGNOSIS — Z72.0 TOBACCO ABUSE: ICD-10-CM

## 2018-11-21 PROCEDURE — G0008 ADMIN INFLUENZA VIRUS VAC: HCPCS | Performed by: NURSE PRACTITIONER

## 2018-11-21 PROCEDURE — G0009 ADMIN PNEUMOCOCCAL VACCINE: HCPCS | Performed by: NURSE PRACTITIONER

## 2018-11-21 PROCEDURE — G0438 PPPS, INITIAL VISIT: HCPCS | Mod: 25 | Performed by: NURSE PRACTITIONER

## 2018-11-21 PROCEDURE — 90686 IIV4 VACC NO PRSV 0.5 ML IM: CPT | Performed by: NURSE PRACTITIONER

## 2018-11-21 PROCEDURE — 90732 PPSV23 VACC 2 YRS+ SUBQ/IM: CPT | Performed by: NURSE PRACTITIONER

## 2018-11-21 ASSESSMENT — ENCOUNTER SYMPTOMS: GENERAL WELL-BEING: GOOD

## 2018-11-21 ASSESSMENT — ACTIVITIES OF DAILY LIVING (ADL): BATHING_REQUIRES_ASSISTANCE: 0

## 2018-11-21 ASSESSMENT — PATIENT HEALTH QUESTIONNAIRE - PHQ9: CLINICAL INTERPRETATION OF PHQ2 SCORE: 0

## 2018-11-21 NOTE — PROGRESS NOTES
Chief Complaint   Patient presents with   • Annual Wellness Visit         HPI:  Cassandra is a 58 y.o. here for Medicare Annual Wellness Visit      1. Medicare annual wellness visit, subsequent  Screening performed below.    2. Pulmonary nodules  Patient was found to have pulmonary nodules last year for which she followed up with oncology and pulmonology.  She was advised to do pulmonary function studies and get repeat CT of the lung 6 months after her February appointment this year.  She did have another CT of the lung in July when she went to urgent care for complaints of cough.  It showed patchy groundglass nodular areas consistent with pneumonitis and patient was ill at the time.  There continue to be small bilateral subpleural pulmonary nodules.  She did not do her pulmonary function studies.  Previous imaging suggested COPD but it has never been c confirmed.  She states she does not need to use her albuterol inhaler and does continue to smoke.    3. Tobacco abuse  Patient has 40 pack-year history of tobacco use and has not quit.    4. Chronic midline low back pain without sciatica  She is going to physiatry for this back pain and states she is currently on gabapentin, amitriptyline, ibuprofen and Zanaflex as needed.  She feels this helps.    5. Need for vaccination  Patient due for this years flu vaccine and has not had pneumonia vaccine.    Patient Active Problem List    Diagnosis Date Noted   • Chronic midline low back pain without sciatica 11/06/2017   • Tobacco abuse 11/06/2017   • S/P hysterectomy 11/06/2017       Current Outpatient Prescriptions   Medication Sig Dispense Refill   • AMITRIPTYLINE HCL PO Take  by mouth.     • gabapentin (NEURONTIN) 300 MG Cap Take 1 Cap by mouth every bedtime.     • azithromycin (ZITHROMAX) 250 MG Tab Take as directed (Patient not taking: Reported on 11/21/2018) 6 Tab 1   • albuterol 108 (90 Base) MCG/ACT Aero Soln inhalation aerosol Inhale 2 Puffs by mouth every 6 hours as  needed. (Patient not taking: Reported on 11/21/2018) 8.5 g 11   • ibuprofen (MOTRIN) 600 MG Tab Take 600 mg by mouth as needed.     • tizanidine (ZANAFLEX) 2 MG capsule Take 2 mg by mouth as needed.     • estradiol (ESTRACE) 1 MG Tab Take 1 Tab by mouth every day. (Patient not taking: Reported on 11/21/2018) 30 Tab      No current facility-administered medications for this visit.         Patient is taking medications as noted in medication list.  Current supplements as per medication list.     Allergies: Codeine and Omnicef [cefdinir]    Current social contact/activities: PT reports living with , mom, and a cat and dog, visits with family and friends, goes out with friends     Is patient current with immunizations? No, due for FLU, PNEUMOVAX (PPSV23), TDAP and SHINGRIX (Shingles). Patient is interested in receiving FLU and PNEUMOVAX (PPSV23) today.    She  reports that she has been smoking Cigarettes.  She has a 32.25 pack-year smoking history. She has never used smokeless tobacco. She reports that she does not drink alcohol or use drugs.  Ready to quit: Not Answered  Counseling given: Not Answered        DPA/Advanced directive: Patient does not have an Advanced Directive.  A packet and workshop information was given on Advanced Directives.    ROS:    Gait: Uses no assistive device   Ostomy: No   Other tubes: No   Amputations: No   Chronic oxygen use No   Last eye exam 10 years ago   Wears hearing aids: No   : Denies any urinary leakage during the last 6 months     Depression Screening    Little interest or pleasure in doing things?  0 - not at all  Feeling down, depressed, or hopeless? 0 - not at all  Patient Health Questionnaire Score: 0    If depressive symptoms identified deferred to follow up visit unless specifically addressed in assessment and plan.    Interpretation of PHQ-9 Total Score   Score Severity   1-4 No Depression   5-9 Mild Depression   10-14 Moderate Depression   15-19 Moderately Severe  Depression   20-27 Severe Depression    Screening for Cognitive Impairment    Three Minute Recall (leader, season, table)  2/3    Sotero clock face with all 12 numbers and set the hands to show 10 past 11.  Yes 2/2  If cognitive concerns identified, deferred for follow up unless specifically addressed in assessment and plan.    Fall Risk Assessment    Has the patient had two or more falls in the last year or any fall with injury in the last year?  No  If fall risk identified, deferred for follow up unless specifically addressed in assessment and plan.    Safety Assessment    Throw rugs on floor.  No  Handrails on all stairs.  Yes  Good lighting in all hallways.  Yes  Difficulty hearing.  No  Patient counseled about all safety risks that were identified.    Functional Assessment ADLs    Are there any barriers preventing you from cooking for yourself or meeting nutritional needs?  No.    Are there any barriers preventing you from driving safely or obtaining transportation?  No.    Are there any barriers preventing you from using a telephone or calling for help?  No.    Are there any barriers preventing you from shopping?  No.    Are there any barriers preventing you from taking care of your own finances?  No.    Are there any barriers preventing you from managing your medications?  No.    Are there any barriers preventing you from showering, bathing or dressing yourself?  No.    Are you currently engaging in any exercise or physical activity?  Yes.  PT report taking care of her mother and house hold choirs. During summer she does hiking and camping.   What is your perception of your health?  Good.    Health Maintenance Summary                Annual Wellness Visit Overdue 1960     IMM DTaP/Tdap/Td Vaccine Overdue 5/4/1978     IMM PNEUMOCOCCAL 19-64 (ADULT) MEDIUM RISK SERIES Overdue 5/4/1978     PAP SMEAR Overdue 5/4/1980     IMM ZOSTER VACCINES Overdue 5/4/2009     IMM INFLUENZA Postponed 4/26/2019 Originally  9/1/2018. Patient Refused    LUNG CANCER SCREENING Next Due 7/7/2019      Done 7/7/2018 CT-CTA CHEST PULMONARY ARTERY W/ RECONS     Patient has more history with this topic...    MAMMOGRAM Next Due 11/14/2019      Done 11/14/2018 MA-MAMMO SCREENING BILAT W/IMPLANTS W/BENTLEY W/O CAD     Patient has more history with this topic...    COLONOSCOPY Next Due 1/29/2028      Done 1/29/2018 REFERRAL TO GI FOR COLONOSCOPY     Patient has more history with this topic...          Patient Care Team:  GARRY Carlos as PCP - General (Family Medicine)  Emmanuel Mi D.O. (Phys Med and Rehab)    Social History   Substance Use Topics   • Smoking status: Current Every Day Smoker     Packs/day: 0.75     Years: 43.00     Types: Cigarettes   • Smokeless tobacco: Never Used      Comment: started at 15   • Alcohol use No     Family History   Problem Relation Age of Onset   • Psychiatry Mother    • Hyperlipidemia Mother    • Cancer Father         asbestos exposure   • Cancer Sister      She  has a past medical history of Back pain and Chickenpox. She also has no past medical history of ASTHMA; Breast cancer (HCC); or Diabetes.   Past Surgical History:   Procedure Laterality Date   • ARTHROSCOPY, KNEE     • GYN SURGERY      hysto   • LAMINOTOMY     • OTHER      knee surgery   • PB ENLARGE BREAST WITH IMPLANT             Exam:     not currently breastfeeding. There is no height or weight on file to calculate BMI.    Hearing good.    Dentition fair  Alert, oriented in no acute distress.  Eye contact is good, speech goal directed, affect calm      Assessment and Plan. The following treatment and monitoring plan is recommended:        1. Medicare annual wellness visit, subsequent  Annual wellness topics discussed, review of chronic medical problems completed    - Initial Wellness Visit - Includes PPPS ()    2. Pulmonary nodules  I reviewed with patient the recommendation by pulmonology to do pulmonary function testing and repeat CT.   She did do a repeat CT at her urgent care visit in July but still has not done pulmonary function testing to verify if she has COPD.  She also was supposed to follow back with pulmonology in August.  I have placed another referral for her for follow-up.  - REFERRAL TO PULMONOLOGY    3. Tobacco abuse  Patient unwilling to quit smoking.  - REFERRAL TO PULMONOLOGY    4. Chronic midline low back pain without sciatica  Medication list updated with patient's medications through physiatry and she reports it does help with her back pain.    5. Need for vaccination  I have placed the below orders and discussed them with an approved delegating provider. The MA is performing the below orders under the direction of Dr. Anthony    - Pneumococal Polysaccharide Vaccine 23-Valent =>3YO SQ/IM  - Influenza Vaccine Quad Injection >3Y (PF)    Services suggested: No services needed at this time  Health Care Screening recommendations as per orders if indicated.  Referrals offered: PT/OT/Nutrition counseling/Behavioral Health/Smoking cessation as per orders if indicated.    Discussion today about general wellness and lifestyle habits:    · Prevent falls and reduce trip hazards; Cautioned about securing or removing rugs.  · Have a working fire alarm and carbon monoxide detector;   · Engage in regular physical activity and social activities       Follow-up: No Follow-up on file.

## 2018-12-11 ENCOUNTER — OFFICE VISIT (OUTPATIENT)
Dept: PULMONOLOGY | Facility: HOSPICE | Age: 58
End: 2018-12-11
Payer: MEDICARE

## 2018-12-11 ENCOUNTER — NON-PROVIDER VISIT (OUTPATIENT)
Dept: PULMONOLOGY | Facility: HOSPICE | Age: 58
End: 2018-12-11
Payer: MEDICARE

## 2018-12-11 VITALS
SYSTOLIC BLOOD PRESSURE: 120 MMHG | RESPIRATION RATE: 16 BRPM | WEIGHT: 103 LBS | HEART RATE: 79 BPM | DIASTOLIC BLOOD PRESSURE: 70 MMHG | HEIGHT: 66 IN | BODY MASS INDEX: 16.55 KG/M2 | OXYGEN SATURATION: 94 % | TEMPERATURE: 97.5 F

## 2018-12-11 DIAGNOSIS — R91.8 PULMONARY NODULES: ICD-10-CM

## 2018-12-11 DIAGNOSIS — Z72.0 TOBACCO ABUSE: ICD-10-CM

## 2018-12-11 DIAGNOSIS — J44.9 CHRONIC OBSTRUCTIVE PULMONARY DISEASE, UNSPECIFIED COPD TYPE (HCC): ICD-10-CM

## 2018-12-11 PROCEDURE — 94729 DIFFUSING CAPACITY: CPT | Performed by: INTERNAL MEDICINE

## 2018-12-11 PROCEDURE — 94060 EVALUATION OF WHEEZING: CPT | Performed by: INTERNAL MEDICINE

## 2018-12-11 PROCEDURE — 94726 PLETHYSMOGRAPHY LUNG VOLUMES: CPT | Performed by: INTERNAL MEDICINE

## 2018-12-11 PROCEDURE — 99214 OFFICE O/P EST MOD 30 MIN: CPT | Mod: 25 | Performed by: INTERNAL MEDICINE

## 2018-12-11 RX ORDER — AMITRIPTYLINE HYDROCHLORIDE 25 MG/1
25 TABLET, FILM COATED ORAL
Refills: 2 | COMMUNITY
Start: 2018-11-20 | End: 2019-05-21

## 2018-12-11 RX ORDER — ALBUTEROL SULFATE 90 UG/1
2 AEROSOL, METERED RESPIRATORY (INHALATION) EVERY 4 HOURS PRN
Qty: 1 INHALER | Refills: 1 | Status: SHIPPED | OUTPATIENT
Start: 2018-12-11 | End: 2019-05-21

## 2018-12-11 ASSESSMENT — PULMONARY FUNCTION TESTS
FEV1/FVC_PERCENT_PREDICTED: 113
FEV1_PERCENT_CHANGE: 6
FEV1/FVC_PERCENT_LLN: 65
FEV1/FVC: 81
FVC_LLN: 2.97
FEV1/FVC_PERCENT_CHANGE: 6
FVC_PERCENT_PREDICTED: 64
FEV1/FVC_PREDICTED: 78
FEV1/FVC: 86.52
FEV1/FVC: 87
FEV1_PERCENT_CHANGE: 0
FEV1_LLN: 2.30
FEV1/FVC_PERCENT_PREDICTED: 103
FVC_PREDICTED: 3.56
FVC_PERCENT_PREDICTED: 64
FVC: 2.3
FEV1_PERCENT_PREDICTED: 67
FEV1: 1.99
FVC: 2.3
FEV1_PREDICTED: 2.76
FEV1/FVC_PERCENT_PREDICTED: 105
FEV1/FVC_PERCENT_PREDICTED: 78
FEV1_PERCENT_PREDICTED: 72
FEV1: 1.86
FEV1/FVC_PERCENT_PREDICTED: 111
FEV1/FVC: 81

## 2018-12-11 NOTE — PROGRESS NOTES
Chief Complaint   Patient presents with   • Pulmonary Nodule     last seen 2/13/18    • Results     CTA 7/7/18. PFT 60       HPI: This patient is a 58 y.o. Female who returns to lung nodule clinic. She has an estimated 40-pack-year history of tobacco use, currently smoking half a pack daily.  She has set a quit date for the end of the year. She had a low dose lung cancer screening chest CAT scan on January 9, 2018 which was personally reviewed. This demonstrated scattered, bilateral , <5mm noncalcified pulmonary nodules. No infiltrates or lymphadenopathy appreciated. She denies dyspnea, cough, wheezing, hemoptysis or chest pain. She denies a personal history of malignancy.  She had URI symptoms in July 2018 with chest CT showing stable nodules, and new groundglass opacities in the right lower lobe. PFT's show FEV1 1.9 L or 72% predicted. She denies the use of inhalers.       Past Medical History:   Diagnosis Date   • Back pain    • Chickenpox        Social History     Social History   • Marital status:      Spouse name: N/A   • Number of children: N/A   • Years of education: N/A     Occupational History   • Not on file.     Social History Main Topics   • Smoking status: Current Every Day Smoker     Packs/day: 0.75     Years: 43.00     Types: Cigarettes   • Smokeless tobacco: Never Used      Comment: started at 15   • Alcohol use No   • Drug use: No   • Sexual activity: Yes     Partners: Male     Other Topics Concern   • Not on file     Social History Narrative   • No narrative on file       Family History   Problem Relation Age of Onset   • Psychiatry Mother    • Hyperlipidemia Mother    • Cancer Father         asbestos exposure   • Cancer Sister        Current Outpatient Prescriptions on File Prior to Visit   Medication Sig Dispense Refill   • tizanidine (ZANAFLEX) 2 MG capsule Take 2 mg by mouth as needed.     • AMITRIPTYLINE HCL PO Take  by mouth.     • albuterol 108 (90 Base) MCG/ACT Aero Soln inhalation  "aerosol Inhale 2 Puffs by mouth every 6 hours as needed. (Patient not taking: Reported on 11/21/2018) 8.5 g 11   • ibuprofen (MOTRIN) 600 MG Tab Take 600 mg by mouth as needed.     • gabapentin (NEURONTIN) 300 MG Cap Take 1 Cap by mouth every bedtime.       No current facility-administered medications on file prior to visit.        Allergies: Codeine and Omnicef [cefdinir]    ROS:   Constitutional: Denies fevers, chills, night sweats, fatigue or weight loss  Eyes: Denies vision loss, pain, drainage, double vision  Ears, Nose, Throat: Denies earache, difficulty hearing, tinnitus, nasal congestion, hoarseness  Cardiovascular: Denies chest pain, tightness, palpitations, orthopnea or edema  Respiratory: As in HPI  Sleep: Denies daytime sleepiness, snoring, apneas, insomnia, morning headaches  GI: Denies heartburn, dysphagia, nausea, abdominal pain, diarrhea or constipation  : Denies frequent urination, hematuria, discharge or painful urination  Musculoskeletal: Denies back pain, painful joints, sore muscles  Neurological: Denies weakness or headaches  Skin: No rashes    Blood pressure 120/70, pulse 79, temperature 36.4 °C (97.5 °F), temperature source Temporal, resp. rate 16, height 1.676 m (5' 6\"), weight 46.7 kg (103 lb), SpO2 94 %, not currently breastfeeding.    Physical Exam:  Appearance: Well-nourished, well-developed, in no acute distress  HEENT: Normocephalic, atraumatic, white sclera, PERRLA, oropharynx clear  Neck: No adenopathy or masses  Respiratory: no intercostal retractions or accessory muscle use  Lungs auscultation: Clear to auscultation bilaterally  Cardiovascular: Regular rate rhythm. No murmurs, rubs or gallops.  No LE edema  Abdomen: soft, nondistended  Gait: Normal  Digits: No clubbing, cyanosis  Motor: No focal deficits  Orientation: Oriented to time, person and place    Diagnosis:  1. Pulmonary nodules  CT-CHEST (THORAX) W/O   2. Tobacco abuse         Plan:  The patient has pulmonary nodules, " felt postinflammatory, with stability on six-month follow-up chest CT.  She is due for her next follow-up chest CAT scan in January 2019.  Smoking cessation counseling provided-she has set a quit date for the end of the month.  Use albuterol HFA 1-2 puffs every 4 hours as needed.  Return in about 2 months (around 2/11/2019) for at lung nodule clinic.

## 2018-12-11 NOTE — PATIENT INSTRUCTIONS
Tobacco Use Disorder  Tobacco use disorder (TUD) is a mental disorder. It is the long-term use of tobacco in spite of related health problems or difficulty with normal life activities. Tobacco is most commonly smoked as cigarettes and less commonly as cigars or pipes. Smokeless chewing tobacco and snuff are also popular. People with TUD get a feeling of extreme pleasure (euphoria) from using tobacco and have a desire to use it again and again. Repeated use of tobacco can cause problems.  The addictive effects of tobacco are due mainly to the ingredient nicotine. Nicotine also causes a rush of adrenaline (epinephrine) in the body. This leads to increased blood pressure, heart rate, and breathing rate. These changes may cause problems for people with high blood pressure, weak hearts, or lung disease. High doses of nicotine in children and pets can lead to seizures and death.  Tobacco contains a number of other unsafe chemicals. These chemicals are especially harmful when inhaled as smoke and can damage almost every organ in the body. Smokers live shorter lives than nonsmokers and are at risk of dying from a number of diseases and cancers. Tobacco smoke can also cause health problems for nonsmokers (due to inhaling secondhand smoke). Smoking is also a fire hazard.  TUD usually starts in the late teenage years and is most common in young adults between the ages of 18 and 25 years. People who start smoking earlier in life are more likely to continue smoking as adults. TUD is somewhat more common in men than women. People with TUD are at higher risk for using alcohol and other drugs of abuse.  What increases the risk?  Risk factors for TUD include:  · Having family members with the disorder.  · Being around people who use tobacco.  · Having an existing mental health issue such as schizophrenia, depression, bipolar disorder, ADHD, or posttraumatic stress disorder (PTSD).  What are the signs or symptoms?  People with  tobacco use disorder have two or more of the following signs and symptoms within 12 months:  · Use of more tobacco over a longer period than intended.  · Not able to cut down or control tobacco use.  · A lot of time spent obtaining or using tobacco.  · Strong desire or urge to use tobacco (craving). Cravings may last for 6 months or longer after quitting.  · Use of tobacco even when use leads to major problems at work, school, or home.  · Use of tobacco even when use leads to relationship problems.  · Giving up or cutting down on important life activities because of tobacco use.  · Repeatedly using tobacco in situations where it puts you or others in physical danger, like smoking in bed.  · Use of tobacco even when it is known that a physical or mental problem is likely related to tobacco use.  ¨ Physical problems are numerous and may include chronic bronchitis, emphysema, lung and other cancers, gum disease, high blood pressure, heart disease, and stroke.  ¨ Mental problems caused by tobacco may include difficulty sleeping and anxiety.  · Need to use greater amounts of tobacco to get the same effect. This means you have developed a tolerance.  · Withdrawal symptoms as a result of stopping or rapidly cutting back use. These symptoms may last a month or more after quitting and include the following:  ¨ Depressed, anxious, or irritable mood.  ¨ Difficulty concentrating.  ¨ Increased appetite.  ¨ Restlessness or trouble sleeping.  ¨ Use of tobacco to avoid withdrawal symptoms.  How is this diagnosed?  Tobacco use disorder is diagnosed by your health care provider. A diagnosis may be made by:  · Your health care provider asking questions about your tobacco use and any problems it may be causing.  · A physical exam.  · Lab tests.  · You may be referred to a mental health professional or addiction specialist.  The severity of tobacco use disorder depends on the number of signs and symptoms you have:  · Mild--Two or three  symptoms.  · Moderate--Four or five symptoms.  · Severe--Six or more symptoms.  How is this treated?  Many people with tobacco use disorder are unable to quit on their own and need help. Treatment options include the following:  · Nicotine replacement therapy (NRT). NRT provides nicotine without the other harmful chemicals in tobacco. NRT gradually lowers the dosage of nicotine in the body and reduces withdrawal symptoms. NRT is available in over-the-counter forms (gum, lozenges, and skin patches) as well as prescription forms (mouth inhaler and nasal spray).  · Medicines. This may include:  ¨ Antidepressant medicine that may reduce nicotine cravings.  ¨ A medicine that acts on nicotine receptors in the brain to reduce cravings and withdrawal symptoms. It may also block the effects of tobacco in people with TUD who relapse.  · Counseling or talk therapy. A form of talk therapy called behavioral therapy is commonly used to treat people with TUD. Behavioral therapy looks at triggers for tobacco use, how to avoid them, and how to cope with cravings. It is most effective in person or by phone but is also available in self-help forms (books and Internet websites).  · Support groups. These provide emotional support, advice, and guidance for quitting tobacco.  The most effective treatment for TUD is usually a combination of medicine, talk therapy, and support groups.  Follow these instructions at home:  · Keep all follow-up visits as directed by your health care provider. This is important.  · Take medicines only as directed by your health care provider.  · Check with your health care provider before starting new prescription or over-the-counter medicines.  Contact a health care provider if:  · You are not able to take your medicines as prescribed.  · Treatment is not helping your TUD and your symptoms get worse.  Get help right away if:  · You have serious thoughts about hurting yourself or others.  · You have trouble  breathing, chest pain, sudden weakness, or sudden numbness in part of your body.  This information is not intended to replace advice given to you by your health care provider. Make sure you discuss any questions you have with your health care provider.  Document Released: 08/23/2005 Document Revised: 08/20/2017 Document Reviewed: 02/13/2015  "[employer] Interactive Patient Education © 2017 "[employer] Inc.

## 2018-12-11 NOTE — PROCEDURES
Good patient effort & cooperation.  The results of this test meet the ATS/ERS standards for acceptability and repeatability.  Predicted equations for Spirometry are N-Liban II, ITS for lung volumes, and [facility] for DLCO.  The DLCO was uncorrected for Hgb.  A bronchodilator of Ventolin HFA- 2puffs via spacer were administered.  DLCO was performed during dilation period.    The FVC is 2.3 L or 64%, FEV1 is 1.86 L or 67%, FEV1/FVC 81%.  Post albuterol FEV1 is 1.99 L or 72%.  %.  Increased RV.  DLCO 99%.  No bronchodilator response.    Interpretation:  Mild to moderate obstructive airways.  Normal gas transfer.  Lack of bronchodilator response does not preclude using inhalers when clinically indicated.

## 2019-01-28 ENCOUNTER — HOSPITAL ENCOUNTER (OUTPATIENT)
Dept: RADIOLOGY | Facility: MEDICAL CENTER | Age: 59
End: 2019-01-28
Attending: INTERNAL MEDICINE
Payer: MEDICARE

## 2019-01-28 DIAGNOSIS — R91.8 PULMONARY NODULES: ICD-10-CM

## 2019-01-28 PROCEDURE — 71250 CT THORAX DX C-: CPT

## 2019-02-19 ENCOUNTER — OFFICE VISIT (OUTPATIENT)
Dept: PULMONOLOGY | Facility: HOSPICE | Age: 59
End: 2019-02-19
Payer: MEDICARE

## 2019-02-19 VITALS
DIASTOLIC BLOOD PRESSURE: 68 MMHG | RESPIRATION RATE: 16 BRPM | TEMPERATURE: 97.3 F | OXYGEN SATURATION: 94 % | HEIGHT: 67 IN | WEIGHT: 108.8 LBS | HEART RATE: 82 BPM | BODY MASS INDEX: 17.08 KG/M2 | SYSTOLIC BLOOD PRESSURE: 122 MMHG

## 2019-02-19 DIAGNOSIS — Z72.0 TOBACCO ABUSE: ICD-10-CM

## 2019-02-19 DIAGNOSIS — R91.8 PULMONARY NODULES: ICD-10-CM

## 2019-02-19 PROCEDURE — 99214 OFFICE O/P EST MOD 30 MIN: CPT | Performed by: INTERNAL MEDICINE

## 2019-02-19 RX ORDER — TIZANIDINE 2 MG/1
TABLET ORAL
COMMUNITY
End: 2019-05-21

## 2019-02-19 RX ORDER — AMOXICILLIN 500 MG/1
CAPSULE ORAL
Refills: 0 | COMMUNITY
Start: 2019-01-31 | End: 2019-05-21

## 2019-02-19 RX ORDER — GABAPENTIN 300 MG/1
CAPSULE ORAL
COMMUNITY
End: 2019-05-21

## 2019-02-19 RX ORDER — HYDROCODONE BITARTRATE AND ACETAMINOPHEN 5; 325 MG/1; MG/1
TABLET ORAL
COMMUNITY
End: 2019-05-21

## 2019-02-19 RX ORDER — CEPHALEXIN 500 MG/1
CAPSULE ORAL
Refills: 0 | COMMUNITY
Start: 2019-01-31 | End: 2019-05-21

## 2019-02-19 RX ORDER — MELOXICAM 15 MG/1
TABLET ORAL
COMMUNITY
End: 2019-05-21

## 2019-02-19 RX ORDER — IBUPROFEN 600 MG/1
TABLET ORAL
COMMUNITY
End: 2019-05-21

## 2019-02-19 RX ORDER — METHOCARBAMOL 500 MG/1
TABLET, FILM COATED ORAL
COMMUNITY
End: 2019-05-21

## 2019-02-19 NOTE — PATIENT INSTRUCTIONS
Tobacco Use Disorder  Tobacco use disorder (TUD) is a mental disorder. It is the long-term use of tobacco in spite of related health problems or difficulty with normal life activities. Tobacco is most commonly smoked as cigarettes and less commonly as cigars or pipes. Smokeless chewing tobacco and snuff are also popular. People with TUD get a feeling of extreme pleasure (euphoria) from using tobacco and have a desire to use it again and again. Repeated use of tobacco can cause problems.  The addictive effects of tobacco are due mainly to the ingredient nicotine. Nicotine also causes a rush of adrenaline (epinephrine) in the body. This leads to increased blood pressure, heart rate, and breathing rate. These changes may cause problems for people with high blood pressure, weak hearts, or lung disease. High doses of nicotine in children and pets can lead to seizures and death.  Tobacco contains a number of other unsafe chemicals. These chemicals are especially harmful when inhaled as smoke and can damage almost every organ in the body. Smokers live shorter lives than nonsmokers and are at risk of dying from a number of diseases and cancers. Tobacco smoke can also cause health problems for nonsmokers (due to inhaling secondhand smoke). Smoking is also a fire hazard.  TUD usually starts in the late teenage years and is most common in young adults between the ages of 18 and 25 years. People who start smoking earlier in life are more likely to continue smoking as adults. TUD is somewhat more common in men than women. People with TUD are at higher risk for using alcohol and other drugs of abuse.  What increases the risk?  Risk factors for TUD include:  · Having family members with the disorder.  · Being around people who use tobacco.  · Having an existing mental health issue such as schizophrenia, depression, bipolar disorder, ADHD, or posttraumatic stress disorder (PTSD).  What are the signs or symptoms?  People with  tobacco use disorder have two or more of the following signs and symptoms within 12 months:  · Use of more tobacco over a longer period than intended.  · Not able to cut down or control tobacco use.  · A lot of time spent obtaining or using tobacco.  · Strong desire or urge to use tobacco (craving). Cravings may last for 6 months or longer after quitting.  · Use of tobacco even when use leads to major problems at work, school, or home.  · Use of tobacco even when use leads to relationship problems.  · Giving up or cutting down on important life activities because of tobacco use.  · Repeatedly using tobacco in situations where it puts you or others in physical danger, like smoking in bed.  · Use of tobacco even when it is known that a physical or mental problem is likely related to tobacco use.  ¨ Physical problems are numerous and may include chronic bronchitis, emphysema, lung and other cancers, gum disease, high blood pressure, heart disease, and stroke.  ¨ Mental problems caused by tobacco may include difficulty sleeping and anxiety.  · Need to use greater amounts of tobacco to get the same effect. This means you have developed a tolerance.  · Withdrawal symptoms as a result of stopping or rapidly cutting back use. These symptoms may last a month or more after quitting and include the following:  ¨ Depressed, anxious, or irritable mood.  ¨ Difficulty concentrating.  ¨ Increased appetite.  ¨ Restlessness or trouble sleeping.  ¨ Use of tobacco to avoid withdrawal symptoms.  How is this diagnosed?  Tobacco use disorder is diagnosed by your health care provider. A diagnosis may be made by:  · Your health care provider asking questions about your tobacco use and any problems it may be causing.  · A physical exam.  · Lab tests.  · You may be referred to a mental health professional or addiction specialist.  The severity of tobacco use disorder depends on the number of signs and symptoms you have:  · Mild--Two or three  symptoms.  · Moderate--Four or five symptoms.  · Severe--Six or more symptoms.  How is this treated?  Many people with tobacco use disorder are unable to quit on their own and need help. Treatment options include the following:  · Nicotine replacement therapy (NRT). NRT provides nicotine without the other harmful chemicals in tobacco. NRT gradually lowers the dosage of nicotine in the body and reduces withdrawal symptoms. NRT is available in over-the-counter forms (gum, lozenges, and skin patches) as well as prescription forms (mouth inhaler and nasal spray).  · Medicines. This may include:  ¨ Antidepressant medicine that may reduce nicotine cravings.  ¨ A medicine that acts on nicotine receptors in the brain to reduce cravings and withdrawal symptoms. It may also block the effects of tobacco in people with TUD who relapse.  · Counseling or talk therapy. A form of talk therapy called behavioral therapy is commonly used to treat people with TUD. Behavioral therapy looks at triggers for tobacco use, how to avoid them, and how to cope with cravings. It is most effective in person or by phone but is also available in self-help forms (books and Internet websites).  · Support groups. These provide emotional support, advice, and guidance for quitting tobacco.  The most effective treatment for TUD is usually a combination of medicine, talk therapy, and support groups.  Follow these instructions at home:  · Keep all follow-up visits as directed by your health care provider. This is important.  · Take medicines only as directed by your health care provider.  · Check with your health care provider before starting new prescription or over-the-counter medicines.  Contact a health care provider if:  · You are not able to take your medicines as prescribed.  · Treatment is not helping your TUD and your symptoms get worse.  Get help right away if:  · You have serious thoughts about hurting yourself or others.  · You have trouble  breathing, chest pain, sudden weakness, or sudden numbness in part of your body.  This information is not intended to replace advice given to you by your health care provider. Make sure you discuss any questions you have with your health care provider.  Document Released: 08/23/2005 Document Revised: 08/20/2017 Document Reviewed: 02/13/2015  [employer] Interactive Patient Education © 2017 [employer]

## 2019-02-19 NOTE — PROGRESS NOTES
"Chief Complaint   Patient presents with   • Results     CT results     HPI: This patient is a 58 y.o. Female who returns to lung nodule clinic. She has an estimated 40-pack-year history of tobacco use, currently smoking half a pack daily.  She is working on quitting.  She had a low dose lung cancer screening chest CAT scan on January 9, 2018 which was personally reviewed. This demonstrated scattered, bilateral , <5mm noncalcified pulmonary nodules. No infiltrates or lymphadenopathy appreciated. She denies dyspnea, wheezing, hemoptysis or chest pain.  She has occasional cough with \"chest rattling\" per she denies a personal history of malignancy.  She had URI symptoms in July 2018 with chest CT showing stable nodules, and new groundglass opacities in the right lower lobe.  Follow-up chest CAT scan January 28, 2019 was reviewed and showed resolution of right lower lobe opacities and stable bilateral pulmonary nodules.  PFT's show FEV1 1.9 L or 72% predicted. She denies the use of inhalers.     Past Medical History:   Diagnosis Date   • Back pain    • Chickenpox        Social History     Social History   • Marital status:      Spouse name: N/A   • Number of children: N/A   • Years of education: N/A     Occupational History   • Not on file.     Social History Main Topics   • Smoking status: Current Every Day Smoker     Packs/day: 0.75     Years: 43.00     Types: Cigarettes   • Smokeless tobacco: Never Used      Comment: started at 15   • Alcohol use No   • Drug use: No   • Sexual activity: Yes     Partners: Male     Other Topics Concern   • Not on file     Social History Narrative   • No narrative on file       Family History   Problem Relation Age of Onset   • Psychiatry Mother    • Hyperlipidemia Mother    • Cancer Father         asbestos exposure   • Cancer Sister        Current Outpatient Prescriptions on File Prior to Visit   Medication Sig Dispense Refill   • amitriptyline (ELAVIL) 25 MG Tab Take 25 mg by " "mouth.  2   • tizanidine (ZANAFLEX) 2 MG capsule Take 2 mg by mouth as needed.     • gabapentin (NEURONTIN) 300 MG Cap Take 1 Cap by mouth every bedtime.     • albuterol (PROAIR HFA) 108 (90 Base) MCG/ACT Aero Soln inhalation aerosol Inhale 2 Puffs by mouth every four hours as needed for Shortness of Breath (wheezing). (Patient not taking: Reported on 2/19/2019) 1 Inhaler 1   • AMITRIPTYLINE HCL PO Take  by mouth.     • albuterol 108 (90 Base) MCG/ACT Aero Soln inhalation aerosol Inhale 2 Puffs by mouth every 6 hours as needed. (Patient not taking: Reported on 11/21/2018) 8.5 g 11   • ibuprofen (MOTRIN) 600 MG Tab Take 600 mg by mouth as needed.       No current facility-administered medications on file prior to visit.        Allergies: Codeine and Omnicef [cefdinir]    ROS:   Constitutional: Denies fevers, chills, night sweats, fatigue or weight loss  Eyes: Denies vision loss, pain, drainage, double vision  Ears, Nose, Throat: Denies earache, difficulty hearing, tinnitus, nasal congestion, hoarseness  Cardiovascular: Denies chest pain, tightness, palpitations, orthopnea or edema  Respiratory: As in HPI  Sleep: Denies daytime sleepiness, snoring, apneas, insomnia, morning headaches  GI: Denies heartburn, dysphagia, nausea, abdominal pain, diarrhea or constipation  : Denies frequent urination, hematuria, discharge or painful urination  Musculoskeletal: Denies back pain, painful joints, sore muscles  Neurological: Denies weakness or headaches  Skin: No rashes    Blood pressure 122/68, pulse 82, temperature 36.3 °C (97.3 °F), temperature source Temporal, resp. rate 16, height 1.702 m (5' 7\"), weight 49.4 kg (108 lb 12.8 oz), SpO2 94 %, not currently breastfeeding.    Physical Exam:  Appearance: Well-nourished, well-developed, in no acute distress  HEENT: Normocephalic, atraumatic, white sclera, PERRLA, oropharynx clear  Neck: No adenopathy or masses  Respiratory: no intercostal retractions or accessory muscle " use  Lungs auscultation: Clear to auscultation bilaterally  Cardiovascular: Regular rate rhythm. No murmurs, rubs or gallops.  No LE edema  Abdomen: soft, nondistended  Gait: Normal  Digits: No clubbing, cyanosis  Motor: No focal deficits  Orientation: Oriented to time, person and place    Diagnosis:  1. Pulmonary nodules  CT-CHEST (THORAX) W/O   2. Tobacco abuse         Plan:  The patient's pulmonary nodules show stability over the past year.  She is an active smoker and smoking cessation counseling was provided in detail.  She appears motivated to quit.  Recommend chest CAT scan without contrast in 6 months for follow-up of nodules.  Return in about 6 months (around 8/19/2019) for at lung nodule clinic.

## 2019-05-06 ENCOUNTER — PATIENT OUTREACH (OUTPATIENT)
Dept: HEALTH INFORMATION MANAGEMENT | Facility: OTHER | Age: 59
End: 2019-05-06

## 2019-05-06 NOTE — PROGRESS NOTES
1. [facility] Verified: yes    2. Verify PCP: yes    3. Review and add  to Care Team: yes    4. WebIZ Checked & Epic Updated: Yes  WebIZ Recommendations: Patient is up to date on all vaccines  Is patient due for Tdap? NO  Is patient due for Shingles? NO    5. Reviewed/Updated the following with patient:       •   Communication Preference Obtained? YES  • MyChart Activation: declined       •   E-Mail Address Obtained? NO       •   Appointment Day and Time Preferences? NO       •   Preferred Pharmacy? YES       •   Preferred Lab? YES    6. Care Gap Scheduling (Attempt to Schedule EACH Overdue Care Gap!)    Scheduled patient for Follow-Up for 6 MONTH FOLLOW UP

## 2019-05-21 ENCOUNTER — OFFICE VISIT (OUTPATIENT)
Dept: MEDICAL GROUP | Facility: MEDICAL CENTER | Age: 59
End: 2019-05-21
Payer: MEDICARE

## 2019-05-21 VITALS
DIASTOLIC BLOOD PRESSURE: 70 MMHG | WEIGHT: 109 LBS | OXYGEN SATURATION: 96 % | SYSTOLIC BLOOD PRESSURE: 118 MMHG | BODY MASS INDEX: 17.11 KG/M2 | HEIGHT: 67 IN | TEMPERATURE: 97.3 F | RESPIRATION RATE: 16 BRPM | HEART RATE: 102 BPM

## 2019-05-21 DIAGNOSIS — M54.50 CHRONIC MIDLINE LOW BACK PAIN WITHOUT SCIATICA: ICD-10-CM

## 2019-05-21 DIAGNOSIS — G89.29 CHRONIC MIDLINE LOW BACK PAIN WITHOUT SCIATICA: ICD-10-CM

## 2019-05-21 DIAGNOSIS — Z72.0 TOBACCO ABUSE: ICD-10-CM

## 2019-05-21 DIAGNOSIS — Z13.220 SCREENING CHOLESTEROL LEVEL: ICD-10-CM

## 2019-05-21 DIAGNOSIS — R91.8 PULMONARY NODULES: ICD-10-CM

## 2019-05-21 DIAGNOSIS — J43.9 PULMONARY EMPHYSEMA, UNSPECIFIED EMPHYSEMA TYPE (HCC): ICD-10-CM

## 2019-05-21 PROCEDURE — 99213 OFFICE O/P EST LOW 20 MIN: CPT | Performed by: NURSE PRACTITIONER

## 2019-05-21 PROCEDURE — 8041 PR SCP AHA: Performed by: NURSE PRACTITIONER

## 2019-05-21 ASSESSMENT — ENCOUNTER SYMPTOMS: BACK PAIN: 1

## 2019-05-21 ASSESSMENT — PATIENT HEALTH QUESTIONNAIRE - PHQ9: CLINICAL INTERPRETATION OF PHQ2 SCORE: 0

## 2019-05-21 NOTE — PROGRESS NOTES
Subjective:      Cassandra Bartlett is a 59 y.o. female who presents with Follow-Up (6 month)        CC: Patient here for six-month follow-up on COPD, pulmonary nodules, chronic back pain.  She is also here for [facility] visit.    HPI Cassandra Bartlett      1. Pulmonary emphysema, unspecified emphysema type (HCC)  Patient had been following with pulmonology because of a 40 pack year history of smoking as well as pulmonary nodules.  PFTs showed mild to moderate obstruction.  Despite this, she has not felt the need to use inhalers and also does not want to have to pay for them.    2. Pulmonary nodules  Patient recently seen by pulmonology and it appears everything was stable but was recommended she do six-month follow-up CT and visit which she states she was not aware of.    3. Tobacco abuse  Patient states she is now down to 1/4 pack of cigarettes per day which is an improvement for her.    4. Chronic midline low back pain without sciatica  Patient follows with pain management and has come off all opiates over the last year.  She is currently using with occasional ibuprofen as well as gabapentin and tizanidine.  She still has low back pain but not as severe as in the past.  There is no radiation of pain or loss of bowel or bladder function.    5. Screening cholesterol level  Patient's last blood work was from January 2018 and she has not had a recent lipid panel.  Current Outpatient Prescriptions   Medication Sig Dispense Refill   • ibuprofen (MOTRIN) 600 MG Tab Take 600 mg by mouth as needed.     • tizanidine (ZANAFLEX) 2 MG capsule Take 2 mg by mouth as needed.     • gabapentin (NEURONTIN) 300 MG Cap Take 1 Cap by mouth every bedtime.       No current facility-administered medications for this visit.      Social History   Substance Use Topics   • Smoking status: Current Every Day Smoker     Packs/day: 0.25     Years: 43.00     Types: Cigarettes   • Smokeless tobacco: Never Used      Comment: started at 15   • Alcohol  "use No     Past Medical History:   Diagnosis Date   • Back pain    • Chickenpox      Family History   Problem Relation Age of Onset   • Psychiatry Mother    • Hyperlipidemia Mother    • Cancer Father         asbestos exposure   • Cancer Sister        Review of Systems   Musculoskeletal: Positive for back pain.   All other systems reviewed and are negative.         Objective:     /70 (BP Location: Right arm, Patient Position: Sitting, BP Cuff Size: Adult)   Pulse (!) 102   Temp 36.3 °C (97.3 °F) (Temporal)   Resp 16   Ht 1.702 m (5' 7\")   Wt 49.4 kg (109 lb)   LMP  (LMP Unknown)   SpO2 96%   BMI 17.07 kg/m²      Physical Exam   Constitutional: She is oriented to person, place, and time. She appears well-developed and well-nourished. No distress.   HENT:   Head: Normocephalic and atraumatic.   Right Ear: External ear normal.   Left Ear: External ear normal.   Nose: Nose normal.   Eyes: Right eye exhibits no discharge. Left eye exhibits no discharge.   Neck: Normal range of motion. Neck supple. No thyromegaly present.   Cardiovascular: Normal rate, regular rhythm and normal heart sounds.  Exam reveals no gallop and no friction rub.    No murmur heard.  Pulmonary/Chest: Effort normal. She has wheezes. She has no rales.   Musculoskeletal: She exhibits no edema or tenderness.        Lumbar back: She exhibits decreased range of motion and pain.   Neurological: She is alert and oriented to person, place, and time. She displays normal reflexes.   Skin: Skin is warm and dry. No rash noted. She is not diaphoretic.   Psychiatric: She has a normal mood and affect. Her behavior is normal. Judgment and thought content normal.   Nursing note and vitals reviewed.         Annual Health Assessment Questions:    1.  Are you currently engaging in any exercise or physical activity? Yes    2.  How would you describe your mood or emotional well-being today? good    3.  Have you had any falls in the last year? No    4.  Have " you noticed any problems with your balance or had difficulty walking? No    5.  In the last six months have you experienced any leakage of urine? No    6. DPA/Advanced Directive: Patient does not have an Advanced Directive.  A packet and workshop information was given on Advanced Directives.     Assessment/Plan:     1. Pulmonary emphysema, unspecified emphysema type (HCC)  Patient declined refills on albuterol feeling they are too expensive and that she did not need them.  She does continue to smoke cigarettes although she has cut back to 1/4 pack/day.  She is not complaining of shortness of breath.  - Comp Metabolic Panel; Future  - CBC WITH DIFFERENTIAL; Future    2. Pulmonary nodules  Patient reminded that she is supposed to follow back with the pulmonary nodule clinic in 6 months from her last visit for CT and visit.  - Comp Metabolic Panel; Future  - CBC WITH DIFFERENTIAL; Future    3. Tobacco abuse  Patient will continue to try to quit smoking altogether.    4. Chronic midline low back pain without sciatica  Patient praised for now being off opiates and will continue with pain management which is prescribing her other medications.    5. Screening cholesterol level  Patient reminded she should do lab work yearly.  - Lipid Profile; Future

## 2019-10-24 NOTE — PROGRESS NOTES
1. Attempt #:1    2. HealthConnect Verified: yes    3. Verify PCP: yes    4. Review Care Team: no    5. WebIZ Checked & Epic Updated: No WebIZ record  · WebIZ Recommendations: FLU  · Is patient due for Tdap? NO  · Is patient due for Shingles? NO    6. Reviewed/Updated the following with patient:       •   Communication Preference Obtained? YES       •   Preferred Pharmacy? YES       •   Preferred Lab? YES       •   Family History (document living status of immediate family members and if + hx of cancer, diabetes, hypertension, hyperlipidemia, heart attack, stroke) YES    7. Annual Wellness Visit Scheduling  · Scheduling Status:Scheduled     8. Care Gap Scheduling (Attempt to Schedule EACH Overdue Care Gap!)     Health Maintenance Due   Topic Date Due   • HEPATITIS C SCREENING  1960   • IMM INFLUENZA (1) 09/01/2019   • MAMMOGRAM  11/14/2019        Scheduled patient for Annual Wellness Visit     9. [employer] Activation: declined    10. [employer] Hillary: no    11. Virtual Visits: no    12. Opt In to Text Messages: no    13. Patient was advised: “This is a free wellness visit. The provider will screen for medical conditions to help you stay healthy. If you have other concerns to address you may be asked to discuss these at a separate visit or there may be an additional fee.”     14. Patient was informed to arrive 15 min prior to their scheduled appointment and bring in their medication bottles.

## 2019-10-28 ENCOUNTER — OFFICE VISIT (OUTPATIENT)
Dept: MEDICAL GROUP | Facility: MEDICAL CENTER | Age: 59
End: 2019-10-28
Payer: MEDICARE

## 2019-10-28 VITALS
BODY MASS INDEX: 17.27 KG/M2 | WEIGHT: 110 LBS | HEART RATE: 84 BPM | TEMPERATURE: 98.7 F | HEIGHT: 67 IN | RESPIRATION RATE: 16 BRPM | DIASTOLIC BLOOD PRESSURE: 72 MMHG | OXYGEN SATURATION: 97 % | SYSTOLIC BLOOD PRESSURE: 116 MMHG

## 2019-10-28 DIAGNOSIS — K21.9 GASTROESOPHAGEAL REFLUX DISEASE WITHOUT ESOPHAGITIS: ICD-10-CM

## 2019-10-28 DIAGNOSIS — J43.9 PULMONARY EMPHYSEMA, UNSPECIFIED EMPHYSEMA TYPE (HCC): ICD-10-CM

## 2019-10-28 DIAGNOSIS — E78.5 DYSLIPIDEMIA: ICD-10-CM

## 2019-10-28 DIAGNOSIS — M54.50 CHRONIC MIDLINE LOW BACK PAIN WITHOUT SCIATICA: ICD-10-CM

## 2019-10-28 DIAGNOSIS — Z11.59 NEED FOR HEPATITIS C SCREENING TEST: ICD-10-CM

## 2019-10-28 DIAGNOSIS — G89.29 CHRONIC MIDLINE LOW BACK PAIN WITHOUT SCIATICA: ICD-10-CM

## 2019-10-28 DIAGNOSIS — Z12.39 SCREENING FOR BREAST CANCER: ICD-10-CM

## 2019-10-28 PROCEDURE — 99213 OFFICE O/P EST LOW 20 MIN: CPT | Performed by: NURSE PRACTITIONER

## 2019-10-28 RX ORDER — FAMOTIDINE 20 MG/1
20 TABLET, FILM COATED ORAL 2 TIMES DAILY
Qty: 200 TAB | Refills: 3 | Status: SHIPPED | OUTPATIENT
Start: 2019-10-28 | End: 2020-06-15

## 2019-10-28 ASSESSMENT — ENCOUNTER SYMPTOMS
BACK PAIN: 1
GENERAL WELL-BEING: GOOD
HEARTBURN: 1

## 2019-10-28 ASSESSMENT — PATIENT HEALTH QUESTIONNAIRE - PHQ9: CLINICAL INTERPRETATION OF PHQ2 SCORE: 0

## 2019-10-28 ASSESSMENT — ACTIVITIES OF DAILY LIVING (ADL): BATHING_REQUIRES_ASSISTANCE: 0

## 2019-10-28 NOTE — PROGRESS NOTES
Subjective:      Anamaria Bartlett is a 59 y.o. female who presents with Annual Exam        CC: Patient here today for acid reflux, dyslipidemia and emphysema.  She initially was on the schedule for her annual Medicare wellness visit but it is too early for it.    HPI       1. Gastroesophageal reflux disease without esophagitis  Patient states she would use Zantac periodically for acid reflux but it has been recalled.  She is wondering what else she can use for her indigestion.  She states she does not use the medicine daily.  Certain foods seem to trigger her acid reflux.    2. Chronic midline low back pain without sciatica  Patient continues with chronic back pain for which she uses ibuprofen, Neurontin and tizanidine as needed    3. Pulmonary emphysema, unspecified emphysema type (HCC)  Patient follows with pulmonology yearly and continues to smoke cigarettes.  She has history of pulmonary nodules and groundglass opacities in the right lower lobe.  She has an appointment with pulmonology in January although she did not remember this or the need for CT of the lung.  She reports no worsening symptoms    4. Dyslipidemia  Patient currently on no medicines for cholesterol but is due for her yearly blood work.    5. Need for hepatitis C screening test  Patient due for screening    Past Medical History:   Diagnosis Date   • Back pain    • Chickenpox      Social History     Socioeconomic History   • Marital status:      Spouse name: Not on file   • Number of children: Not on file   • Years of education: Not on file   • Highest education level: Not on file   Occupational History   • Not on file   Social Needs   • Financial resource strain: Not on file   • Food insecurity:     Worry: Not on file     Inability: Not on file   • Transportation needs:     Medical: Not on file     Non-medical: Not on file   Tobacco Use   • Smoking status: Current Every Day Smoker     Packs/day: 0.25     Years: 43.00     Pack years: 10.75      Types: Cigarettes   • Smokeless tobacco: Never Used   • Tobacco comment: started at 15   Substance and Sexual Activity   • Alcohol use: No   • Drug use: No   • Sexual activity: Yes     Partners: Male   Lifestyle   • Physical activity:     Days per week: Not on file     Minutes per session: Not on file   • Stress: Not on file   Relationships   • Social connections:     Talks on phone: Not on file     Gets together: Not on file     Attends Alevism service: Not on file     Active member of club or organization: Not on file     Attends meetings of clubs or organizations: Not on file     Relationship status: Not on file   • Intimate partner violence:     Fear of current or ex partner: Not on file     Emotionally abused: Not on file     Physically abused: Not on file     Forced sexual activity: Not on file   Other Topics Concern   • Not on file   Social History Narrative   • Not on file     Current Outpatient Medications   Medication Sig Dispense Refill   • famotidine (PEPCID) 20 MG Tab Take 1 Tab by mouth 2 times a day. 200 Tab 3   • ibuprofen (MOTRIN) 600 MG Tab Take 600 mg by mouth as needed.     • tizanidine (ZANAFLEX) 2 MG capsule Take 2 mg by mouth as needed.     • gabapentin (NEURONTIN) 300 MG Cap Take 1 Cap by mouth every bedtime.       No current facility-administered medications for this visit.      Family History   Problem Relation Age of Onset   • Psychiatric Illness Mother    • Hyperlipidemia Mother    • Diabetes Mother    • Arthritis Mother         In Bladder    • Cancer Father         asbestos exposure   • Cancer Sister    • Heart Attack Brother    • Heart Attack Brother    • Prostate cancer Brother          Review of Systems   Gastrointestinal: Positive for heartburn.   Musculoskeletal: Positive for back pain.   All other systems reviewed and are negative.         Objective:     /72 (BP Location: Left arm, Patient Position: Sitting, BP Cuff Size: Adult)   Pulse 84   Temp 37.1 °C (98.7 °F)  "(Temporal)   Resp 16   Ht 1.702 m (5' 7\")   Wt 49.9 kg (110 lb)   LMP  (LMP Unknown)   SpO2 97%   BMI 17.23 kg/m²      Physical Exam   Constitutional: She is oriented to person, place, and time. She appears well-developed and well-nourished. No distress.   HENT:   Head: Normocephalic and atraumatic.   Right Ear: External ear normal.   Left Ear: External ear normal.   Nose: Nose normal.   Eyes: Right eye exhibits no discharge. Left eye exhibits no discharge.   Neck: Normal range of motion. Neck supple. No thyromegaly present.   Cardiovascular: Normal rate, regular rhythm and normal heart sounds. Exam reveals no gallop and no friction rub.   No murmur heard.  Pulmonary/Chest: Effort normal and breath sounds normal. She has no wheezes. She has no rales.   Musculoskeletal: She exhibits no edema or tenderness.   Neurological: She is alert and oriented to person, place, and time. She displays normal reflexes.   Skin: Skin is warm and dry. No rash noted. She is not diaphoretic.   Psychiatric: She has a normal mood and affect. Her behavior is normal. Judgment and thought content normal.   Nursing note and vitals reviewed.              Assessment/Plan:     1. Gastroesophageal reflux disease without esophagitis  I will switch patient over from the recalled Zantac to Pepcid.  I advised her to use it only as needed and report any worsening symptoms immediately.  She would prefer not to have an EGD  - famotidine (PEPCID) 20 MG Tab; Take 1 Tab by mouth 2 times a day.  Dispense: 200 Tab; Refill: 3    2. Chronic midline low back pain without sciatica  Patient does not appear to be on any controlled substances currently.  She is currently treating with over-the-counter medicines  - CBC WITH DIFFERENTIAL; Future    3. Pulmonary emphysema, unspecified emphysema type (HCC)  Patient reminded she is supposed to get a CT of the lung and follow-up with pulmonology in January.  - Comp Metabolic Panel; Future  - CBC WITH DIFFERENTIAL; " Future    4. Dyslipidemia  Patient will do lab work and follow-up for annual Medicare wellness visit  - Lipid Profile; Future  - CBC WITH DIFFERENTIAL; Future    5. Need for hepatitis C screening test  Patient due for screening  - HEP C VIRUS ANTIBODY; Future

## 2019-11-18 ENCOUNTER — HOSPITAL ENCOUNTER (OUTPATIENT)
Dept: LAB | Facility: MEDICAL CENTER | Age: 59
End: 2019-11-18
Attending: NURSE PRACTITIONER
Payer: MEDICARE

## 2019-11-18 DIAGNOSIS — E78.5 DYSLIPIDEMIA: ICD-10-CM

## 2019-11-18 DIAGNOSIS — G89.29 CHRONIC MIDLINE LOW BACK PAIN WITHOUT SCIATICA: ICD-10-CM

## 2019-11-18 DIAGNOSIS — Z11.59 NEED FOR HEPATITIS C SCREENING TEST: ICD-10-CM

## 2019-11-18 DIAGNOSIS — M54.50 CHRONIC MIDLINE LOW BACK PAIN WITHOUT SCIATICA: ICD-10-CM

## 2019-11-18 DIAGNOSIS — J43.9 PULMONARY EMPHYSEMA, UNSPECIFIED EMPHYSEMA TYPE (HCC): ICD-10-CM

## 2019-11-18 LAB
ALBUMIN SERPL BCP-MCNC: 4.6 G/DL (ref 3.2–4.9)
ALBUMIN/GLOB SERPL: 1.8 G/DL
ALP SERPL-CCNC: 54 U/L (ref 30–99)
ALT SERPL-CCNC: 8 U/L (ref 2–50)
ANION GAP SERPL CALC-SCNC: 6 MMOL/L (ref 0–11.9)
AST SERPL-CCNC: 16 U/L (ref 12–45)
BASOPHILS # BLD AUTO: 1.5 % (ref 0–1.8)
BASOPHILS # BLD: 0.1 K/UL (ref 0–0.12)
BILIRUB SERPL-MCNC: 0.6 MG/DL (ref 0.1–1.5)
BUN SERPL-MCNC: 18 MG/DL (ref 8–22)
CALCIUM SERPL-MCNC: 9.3 MG/DL (ref 8.5–10.5)
CHLORIDE SERPL-SCNC: 106 MMOL/L (ref 96–112)
CHOLEST SERPL-MCNC: 229 MG/DL (ref 100–199)
CO2 SERPL-SCNC: 28 MMOL/L (ref 20–33)
CREAT SERPL-MCNC: 0.82 MG/DL (ref 0.5–1.4)
EOSINOPHIL # BLD AUTO: 0.08 K/UL (ref 0–0.51)
EOSINOPHIL NFR BLD: 1.2 % (ref 0–6.9)
ERYTHROCYTE [DISTWIDTH] IN BLOOD BY AUTOMATED COUNT: 41.8 FL (ref 35.9–50)
GLOBULIN SER CALC-MCNC: 2.5 G/DL (ref 1.9–3.5)
GLUCOSE SERPL-MCNC: 82 MG/DL (ref 65–99)
HCT VFR BLD AUTO: 48.1 % (ref 37–47)
HDLC SERPL-MCNC: 73 MG/DL
HGB BLD-MCNC: 16.2 G/DL (ref 12–16)
IMM GRANULOCYTES # BLD AUTO: 0.04 K/UL (ref 0–0.11)
IMM GRANULOCYTES NFR BLD AUTO: 0.6 % (ref 0–0.9)
LDLC SERPL CALC-MCNC: 142 MG/DL
LYMPHOCYTES # BLD AUTO: 2.11 K/UL (ref 1–4.8)
LYMPHOCYTES NFR BLD: 31.4 % (ref 22–41)
MCH RBC QN AUTO: 29.2 PG (ref 27–33)
MCHC RBC AUTO-ENTMCNC: 33.7 G/DL (ref 33.6–35)
MCV RBC AUTO: 86.7 FL (ref 81.4–97.8)
MONOCYTES # BLD AUTO: 0.41 K/UL (ref 0–0.85)
MONOCYTES NFR BLD AUTO: 6.1 % (ref 0–13.4)
NEUTROPHILS # BLD AUTO: 3.97 K/UL (ref 2–7.15)
NEUTROPHILS NFR BLD: 59.2 % (ref 44–72)
NRBC # BLD AUTO: 0 K/UL
NRBC BLD-RTO: 0 /100 WBC
PLATELET # BLD AUTO: 243 K/UL (ref 164–446)
PMV BLD AUTO: 10.9 FL (ref 9–12.9)
POTASSIUM SERPL-SCNC: 4.3 MMOL/L (ref 3.6–5.5)
PROT SERPL-MCNC: 7.1 G/DL (ref 6–8.2)
RBC # BLD AUTO: 5.55 M/UL (ref 4.2–5.4)
SODIUM SERPL-SCNC: 140 MMOL/L (ref 135–145)
TRIGL SERPL-MCNC: 70 MG/DL (ref 0–149)
WBC # BLD AUTO: 6.7 K/UL (ref 4.8–10.8)

## 2019-11-18 PROCEDURE — 80061 LIPID PANEL: CPT

## 2019-11-18 PROCEDURE — 80053 COMPREHEN METABOLIC PANEL: CPT

## 2019-11-18 PROCEDURE — 86803 HEPATITIS C AB TEST: CPT

## 2019-11-18 PROCEDURE — 85025 COMPLETE CBC W/AUTO DIFF WBC: CPT

## 2019-11-18 PROCEDURE — 36415 COLL VENOUS BLD VENIPUNCTURE: CPT

## 2019-11-19 LAB — HCV AB SER QL: NEGATIVE

## 2020-01-02 ENCOUNTER — TELEPHONE (OUTPATIENT)
Dept: PULMONOLOGY | Facility: HOSPICE | Age: 60
End: 2020-01-02

## 2020-01-02 NOTE — TELEPHONE ENCOUNTER
Appt: 01/14/2020 with Dr Luo.    Order: CT-Chest    Called and spoke with pt. Notified pt of this. She will try to schedule this prior to her appt. Provided pt with the number to call to schedule.

## 2020-01-08 ENCOUNTER — HOSPITAL ENCOUNTER (OUTPATIENT)
Dept: RADIOLOGY | Facility: MEDICAL CENTER | Age: 60
End: 2020-01-08
Attending: INTERNAL MEDICINE
Payer: MEDICARE

## 2020-01-08 DIAGNOSIS — R91.8 PULMONARY NODULES: ICD-10-CM

## 2020-01-08 PROCEDURE — 71250 CT THORAX DX C-: CPT

## 2020-01-14 ENCOUNTER — OFFICE VISIT (OUTPATIENT)
Dept: PULMONOLOGY | Facility: HOSPICE | Age: 60
End: 2020-01-14
Payer: MEDICARE

## 2020-01-14 VITALS
BODY MASS INDEX: 17.58 KG/M2 | RESPIRATION RATE: 14 BRPM | HEART RATE: 72 BPM | WEIGHT: 112 LBS | OXYGEN SATURATION: 94 % | SYSTOLIC BLOOD PRESSURE: 108 MMHG | DIASTOLIC BLOOD PRESSURE: 68 MMHG | HEIGHT: 67 IN

## 2020-01-14 DIAGNOSIS — R91.8 PULMONARY NODULES: ICD-10-CM

## 2020-01-14 DIAGNOSIS — Z72.0 TOBACCO ABUSE: ICD-10-CM

## 2020-01-14 DIAGNOSIS — J40 BRONCHITIS: ICD-10-CM

## 2020-01-14 PROCEDURE — 99214 OFFICE O/P EST MOD 30 MIN: CPT | Performed by: INTERNAL MEDICINE

## 2020-01-14 RX ORDER — AZITHROMYCIN 250 MG/1
TABLET, FILM COATED ORAL
Qty: 6 TAB | Refills: 0 | Status: SHIPPED | OUTPATIENT
Start: 2020-01-14 | End: 2020-06-15

## 2020-01-14 NOTE — PROGRESS NOTES
Chief Complaint   Patient presents with   • Follow-Up     6 mo     HPI: This patient is a 59 y.o. Female who returns to lung nodule clinic. She has an estimated 40-pack-year history of tobacco use, currently smoking half a pack daily. She had a low dose lung cancer screening chest CAT scan on January 9, 2018 which was personally reviewed. This demonstrated scattered, bilateral , <5mm noncalcified pulmonary nodules. No infiltrates or lymphadenopathy appreciated. She denies dyspnea, wheezing, hemoptysis or chest pain.  She had URI symptoms in July 2018 with chest CT showing stable nodules, and new groundglass opacities in the right lower lobe.  Follow-up chest CAT scan January 28, 2019 showed resolution of right lower lobe opacities and stable bilateral pulmonary nodules.  Chest CAT scan from January 2020 showed stable nodules.  Over the past week she has developed mild cough with nasal congestion and sore throat.  Denies fevers, chills or myalgias.  Former PFT's show FEV1 1.9 L or 72% predicted.    Past Medical History:   Diagnosis Date   • Back pain    • Chickenpox        Social History     Socioeconomic History   • Marital status:      Spouse name: Not on file   • Number of children: Not on file   • Years of education: Not on file   • Highest education level: Not on file   Occupational History   • Not on file   Social Needs   • Financial resource strain: Not on file   • Food insecurity:     Worry: Not on file     Inability: Not on file   • Transportation needs:     Medical: Not on file     Non-medical: Not on file   Tobacco Use   • Smoking status: Current Every Day Smoker     Packs/day: 0.25     Years: 43.00     Pack years: 10.75     Types: Cigarettes   • Smokeless tobacco: Never Used   • Tobacco comment: started at 15   Substance and Sexual Activity   • Alcohol use: No   • Drug use: No   • Sexual activity: Yes     Partners: Male   Lifestyle   • Physical activity:     Days per week: Not on file     Minutes  per session: Not on file   • Stress: Not on file   Relationships   • Social connections:     Talks on phone: Not on file     Gets together: Not on file     Attends Rastafari service: Not on file     Active member of club or organization: Not on file     Attends meetings of clubs or organizations: Not on file     Relationship status: Not on file   • Intimate partner violence:     Fear of current or ex partner: Not on file     Emotionally abused: Not on file     Physically abused: Not on file     Forced sexual activity: Not on file   Other Topics Concern   • Not on file   Social History Narrative   • Not on file       Family History   Problem Relation Age of Onset   • Psychiatric Illness Mother    • Hyperlipidemia Mother    • Diabetes Mother    • Arthritis Mother         In Bladder    • Cancer Father         asbestos exposure   • Cancer Sister    • Heart Attack Brother    • Heart Attack Brother    • Prostate cancer Brother        Current Outpatient Medications on File Prior to Visit   Medication Sig Dispense Refill   • ibuprofen (MOTRIN) 600 MG Tab Take 600 mg by mouth as needed.     • famotidine (PEPCID) 20 MG Tab Take 1 Tab by mouth 2 times a day. 200 Tab 3   • tizanidine (ZANAFLEX) 2 MG capsule Take 2 mg by mouth as needed.     • gabapentin (NEURONTIN) 300 MG Cap Take 1 Cap by mouth every bedtime.       No current facility-administered medications on file prior to visit.        Allergies: Codeine and Omnicef [cefdinir]    ROS:   Constitutional: Denies fevers, chills, night sweats, fatigue or weight loss  Eyes: Denies vision loss, pain, drainage, double vision  Ears, Nose, Throat: Denies earache, difficulty hearing, tinnitus, +nasal congestion, denies hoarseness  Cardiovascular: Denies chest pain, tightness, palpitations, orthopnea or edema  Respiratory: As in HPI sleep: Denies daytime sleepiness, snoring, apneas, insomnia, morning headaches  GI: Denies heartburn, dysphagia, nausea, abdominal pain, diarrhea or  "constipation  : Denies frequent urination, hematuria, discharge or painful urination  Musculoskeletal: Denies back pain, painful joints, sore muscles  Neurological: Denies weakness or headaches  Skin: No rashes    /68 (BP Location: Left arm, Patient Position: Sitting)   Pulse 72   Resp 14   Ht 1.702 m (5' 7\")   Wt 50.8 kg (112 lb)   SpO2 94%     Physical Exam:  Appearance: Well-nourished, well-developed, in no acute distress  HEENT: Normocephalic, atraumatic, white sclera, PERRLA, oropharynx clear  Neck: No adenopathy or masses  Respiratory: no intercostal retractions or accessory muscle use  Lungs auscultation: RLL rhonchi   Cardiovascular: Regular rate rhythm. No murmurs, rubs or gallops.  No LE edema  Abdomen: soft, nondistended  Gait: Normal  Digits: No clubbing, cyanosis  Motor: No focal deficits  Orientation: Oriented to time, person and place    Diagnosis:  1. Pulmonary nodules     2. Tobacco abuse  CT-LUNG CANCER-SCREENING   3. Bronchitis  azithromycin (ZITHROMAX Z-CASSANDRA) 250 MG Tab       Plan:  The patient's pulmonary nodules are stable, suspected postinflammatory.  Recommend annual low-dose lung cancer screening chest CAT scan.  Smoking cessation counseling provided.  Z-Cassandra for acute bronchitis.  Return in about 1 year (around 1/14/2021) for at lung nodule clinic.      "

## 2020-02-24 RX ORDER — OMEPRAZOLE 20 MG/1
CAPSULE, DELAYED RELEASE ORAL
Qty: 30 CAP | Refills: 10 | Status: SHIPPED | OUTPATIENT
Start: 2020-02-24 | End: 2020-07-27

## 2020-05-04 NOTE — PROGRESS NOTES
Outcome: Left Message Birthday call    Please transfer to Patient Outreach Team at [phone] when patient returns call.    [employer]Connect Verified: yes    Attempt # 1

## 2020-06-11 NOTE — PROGRESS NOTES
1. Attempt #:1    2. HealthConnect Verified: yes    3. Verify PCP: yes    4. Review Care Team: yes    5. WebIZ Checked & Epic Updated: No WebIZ record  · WebIZ Recommendations: Patient is up to date on all vaccines  · Is patient due for Tdap? NO  · Is patient due for Shingles? NO    6. Reviewed/Updated the following with patient:       •   Communication Preference Obtained? YES       •   Preferred Pharmacy? YES       •   Preferred Lab? YES       •   Family History (document living status of immediate family members and if + hx of cancer, diabetes, hypertension, hyperlipidemia, heart attack, stroke) YES    7. Annual Wellness Visit Scheduling  · Scheduling Status:Scheduled     8. Care Gap Scheduling (Attempt to Schedule EACH Overdue Care Gap!)     Health Maintenance Due   Topic Date Due   • MAMMOGRAM  11/14/2019   • Annual Wellness Visit  11/22/2019   • Annual Pulmonary Function Test / Spirometry  12/11/2019        Scheduled patient for Annual Wellness Visit     9. [employer] Activation: already active    10. [employer] Hillary: no    11. Virtual Visits: no    12. Opt In to Text Messages: no    13. Patient was advised: “This is a free wellness visit. The provider will screen for medical conditions to help you stay healthy. If you have other concerns to address you may be asked to discuss these at a separate visit or there may be an additional fee.”     14. Patient was informed to arrive 15 min prior to their scheduled appointment and bring in their medication bottles.

## 2020-06-15 ENCOUNTER — OFFICE VISIT (OUTPATIENT)
Dept: MEDICAL GROUP | Facility: MEDICAL CENTER | Age: 60
End: 2020-06-15
Payer: MEDICARE

## 2020-06-15 VITALS
TEMPERATURE: 97.4 F | HEART RATE: 90 BPM | BODY MASS INDEX: 18.21 KG/M2 | HEIGHT: 67 IN | RESPIRATION RATE: 16 BRPM | WEIGHT: 116 LBS | OXYGEN SATURATION: 96 % | SYSTOLIC BLOOD PRESSURE: 110 MMHG | DIASTOLIC BLOOD PRESSURE: 70 MMHG

## 2020-06-15 DIAGNOSIS — J43.9 PULMONARY EMPHYSEMA, UNSPECIFIED EMPHYSEMA TYPE (HCC): ICD-10-CM

## 2020-06-15 DIAGNOSIS — E78.5 DYSLIPIDEMIA: ICD-10-CM

## 2020-06-15 DIAGNOSIS — Z12.31 ENCOUNTER FOR SCREENING MAMMOGRAM FOR BREAST CANCER: ICD-10-CM

## 2020-06-15 DIAGNOSIS — R91.8 PULMONARY NODULES: ICD-10-CM

## 2020-06-15 DIAGNOSIS — Z00.00 MEDICARE ANNUAL WELLNESS VISIT, SUBSEQUENT: ICD-10-CM

## 2020-06-15 DIAGNOSIS — M54.50 CHRONIC MIDLINE LOW BACK PAIN WITHOUT SCIATICA: ICD-10-CM

## 2020-06-15 DIAGNOSIS — G89.29 CHRONIC MIDLINE LOW BACK PAIN WITHOUT SCIATICA: ICD-10-CM

## 2020-06-15 DIAGNOSIS — Z72.0 TOBACCO ABUSE: ICD-10-CM

## 2020-06-15 PROCEDURE — G0439 PPPS, SUBSEQ VISIT: HCPCS | Performed by: NURSE PRACTITIONER

## 2020-06-15 ASSESSMENT — ACTIVITIES OF DAILY LIVING (ADL): BATHING_REQUIRES_ASSISTANCE: 0

## 2020-06-15 ASSESSMENT — ENCOUNTER SYMPTOMS: GENERAL WELL-BEING: GOOD

## 2020-06-15 ASSESSMENT — FIBROSIS 4 INDEX: FIB4 SCORE: 1.42

## 2020-06-15 ASSESSMENT — PATIENT HEALTH QUESTIONNAIRE - PHQ9: CLINICAL INTERPRETATION OF PHQ2 SCORE: 0

## 2020-06-15 NOTE — PROGRESS NOTES
CC: Patient here today for annual Medicare wellness visit.    HPI:   Cassandra presents today with the following.    1. Medicare annual wellness visit, subsequent  Screening performed below.    2. Pulmonary emphysema, unspecified emphysema type (HCC)  Patient reports that she feels her breathing is doing well and she had a CT of the lungs 5 months ago which showed stable.  She was last seen in January by pulmonology with annual low-dose screening recommended and smoking cessation.    3. Pulmonary nodules  Pulmonary nodules in January were suspected to be postinflammatory and stable and yearly low-dose testing recommended    4. Tobacco abuse  Patient continues to smoke    5. Chronic midline low back pain without sciatica  Patient follows with pain management and she is mostly treating with ibuprofen, tizanidine and Neurontin.  This is Workmen's Comp. related    6. Dyslipidemia  Cholesterol level slightly elevated but her ASCVD score is at 5%    7. Encounter for screening mammogram for breast cancer  Patient due for mammogram      Depression Screening    Little interest or pleasure in doing things?  0 - not at all  Feeling down, depressed , or hopeless? 0 - not at all  Patient Health Questionnaire Score: 0     If depressive symptoms identified deferred to follow up visit unless specifically addressed in assessment and plan.    Interpretation of PHQ-9 Total Score   Score Severity   1-4 No Depression   5-9 Mild Depression   10-14 Moderate Depression   15-19 Moderately Severe Depression   20-27 Severe Depression    Screening for Cognitive Impairment    Three Minute Recall (village, kitchen, baby) 3/3    Sotero clock face with all 12 numbers and set the hands to show 10 past 10.  Yes    Cognitive concerns identified deferred for follow up unless specifically addressed in assessment and plan.    Fall Risk Assessment    Has the patient had two or more falls in the last year or any fall with injury in the last year?   No    Safety Assessment    Throw rugs on floor.  Yes  Handrails on all stairs.  Yes  Good lighting in all hallways.  Yes  Difficulty hearing.  No  Patient counseled about all safety risks that were identified.    Functional Assessment ADLs    Are there any barriers preventing you from cooking for yourself or meeting nutritional needs?  No.    Are there any barriers preventing you from driving safely or obtaining transportation?  No.    Are there any barriers preventing you from using a telephone or calling for help?  No.    Are there any barriers preventing you from shopping?  No.    Are there any barriers preventing you from taking care of your own finances?  No.    Are there any barriers preventing you from managing your medications?  No.    Are there any barriers preventing you from showering, bathing or dressing yourself?  No.    Are you currently engaging in any exercise or physical activity?  Yes.     What is your perception of your health?  Good.      Health Maintenance Summary                MAMMOGRAM Overdue 11/14/2019      Done 11/14/2018 MA-MAMMO SCREENING BILAT W/IMPLANTS W/BENTLEY W/O CAD     Patient has more history with this topic...    Annual Wellness Visit Overdue 11/22/2019      Done 11/21/2018 Visit Dx: Medicare annual wellness visit, subsequent    Annual Pulmonary Function Test / Spirometry Overdue 12/11/2019      Done 12/11/2018 AMB PULMONARY FUNCTION TEST/LAB    IMM ZOSTER VACCINES Postponed 11/15/2022 Originally 5/4/2009. System: vaccine not available, other system reasons    IMM DTaP/Tdap/Td Vaccine Postponed 11/5/2024 Originally 5/4/1978. Patient Refused    IMM INFLUENZA Next Due 9/1/2020      Done 11/21/2018 Imm Admin: Influenza Vaccine Quad Inj (Pf)    LUNG CANCER SCREENING Next Due 1/8/2021      Done 1/8/2020 CT-CHEST (THORAX) W/O     Patient has more history with this topic...    COLONOSCOPY Next Due 1/29/2028      Done 1/29/2018 REFERRAL TO GI FOR COLONOSCOPY     Patient has more history  "with this topic...          Patient Care Team:  GARRY Carlos as PCP - General (Family Medicine)  Emmanuel Mi D.O. (Phys Med and Rehab)  Constantine Quintana Ass't as            Patient Active Problem List    Diagnosis Date Noted   • Pulmonary emphysema (HCC) 05/21/2019   • Pulmonary nodules 11/21/2018   • Chronic midline low back pain without sciatica 11/06/2017   • Tobacco abuse 11/06/2017   • S/P hysterectomy 11/06/2017       Current Outpatient Medications   Medication Sig Dispense Refill   • omeprazole (PRILOSEC) 20 MG delayed-release capsule TAKE 1 CAPSULE BY MOUTH DAILY 30 Cap 10   • ibuprofen (MOTRIN) 600 MG Tab Take 600 mg by mouth as needed.     • tizanidine (ZANAFLEX) 2 MG capsule Take 2 mg by mouth as needed.     • gabapentin (NEURONTIN) 300 MG Cap Take 1 Cap by mouth every bedtime.       No current facility-administered medications for this visit.          Allergies as of 06/15/2020 - Reviewed 06/15/2020   Allergen Reaction Noted   • Codeine  06/30/2010   • Omnicef [cefdinir] Hives 07/12/2018        ROS: As per HPI.    /70 (BP Location: Right arm, Patient Position: Sitting, BP Cuff Size: Adult)   Pulse 90   Temp 36.3 °C (97.4 °F) (Temporal)   Resp 16   Ht 1.702 m (5' 7\")   Wt 52.6 kg (116 lb)   LMP  (LMP Unknown)   SpO2 96%   BMI 18.17 kg/m²     Physical Exam:  Gen:         Alert and oriented, smiling and in no distress  Dentition: Good  Hearing: Patient able to hear everything today without difficulty    Assessment and Plan.   60 y.o. female with the following issues.    1. Medicare annual wellness visit, subsequent  Annual wellness topics discussed, review of chronic medical problems completed    - Subsequent Annual Wellness Visit - Includes PPPS ()    2. Pulmonary emphysema, unspecified emphysema type (HCC)  Patient appears to be due for pulmonary function testing and I will order this today as well as have her do follow-up lab work " before her next visit.  - Comp Metabolic Panel; Future  - CBC WITHOUT DIFFERENTIAL; Future  - PULMONARY FUNCTION TESTS -Test requested: Complete Pulmonary Function Test; Future    3. Pulmonary nodules  Appear to be postinflammatory on her last check and were tiny in size with yearly follow-up recommended    4. Tobacco abuse  Patient continues to smoke    5. Chronic midline low back pain without sciatica  Patient does not feel pain management is helping very much but because this is Workmen's Comp., she needs to follow with them for her tizanidine and gabapentin.    6. Dyslipidemia  Cholesterol levels slightly elevated but ASCVD score is only 5%.  - Comp Metabolic Panel; Future  - Lipid Profile; Future    7. Encounter for screening mammogram for breast cancer    - MA-SCREENING MAMMO BILAT W/CAD; Future

## 2020-07-27 ENCOUNTER — TELEPHONE (OUTPATIENT)
Dept: MEDICAL GROUP | Facility: MEDICAL CENTER | Age: 60
End: 2020-07-27

## 2020-07-27 DIAGNOSIS — K21.9 GASTROESOPHAGEAL REFLUX DISEASE WITHOUT ESOPHAGITIS: ICD-10-CM

## 2020-07-27 RX ORDER — FAMOTIDINE 20 MG/1
20 TABLET, FILM COATED ORAL 2 TIMES DAILY
Qty: 60 TAB | Refills: 5 | Status: ON HOLD | OUTPATIENT
Start: 2020-07-27 | End: 2021-03-05

## 2020-07-27 NOTE — TELEPHONE ENCOUNTER
1. Caller Name: Cassandra Bartlett                          Call Back Number: [phone] (home)       Pt would like Pepcid to be sent to her pharmacy, she said the antiacid that you gave her is not working          How would the patient prefer to be contacted with a response: Phone call OK to leave a detailed message

## 2021-02-12 ENCOUNTER — TELEPHONE (OUTPATIENT)
Dept: SLEEP MEDICINE | Facility: MEDICAL CENTER | Age: 61
End: 2021-02-12

## 2021-02-12 DIAGNOSIS — R91.8 LUNG NODULES: ICD-10-CM

## 2021-02-12 NOTE — TELEPHONE ENCOUNTER
Ms. Bartlett is scheduled for yearly FV on . Your last note mentions a yearly chest CT, but order is . Would you like to place new order? I will call patient to r/s if needed. Please advise.    Routed to Dr Luo for review

## 2021-02-23 ENCOUNTER — HOSPITAL ENCOUNTER (OUTPATIENT)
Dept: LAB | Facility: MEDICAL CENTER | Age: 61
End: 2021-02-23
Attending: NURSE PRACTITIONER
Payer: MEDICARE

## 2021-02-23 DIAGNOSIS — E78.5 DYSLIPIDEMIA: ICD-10-CM

## 2021-02-23 DIAGNOSIS — J43.9 PULMONARY EMPHYSEMA, UNSPECIFIED EMPHYSEMA TYPE (HCC): ICD-10-CM

## 2021-02-23 LAB
ALBUMIN SERPL BCP-MCNC: 4.1 G/DL (ref 3.2–4.9)
ALBUMIN/GLOB SERPL: 1.5 G/DL
ALP SERPL-CCNC: 48 U/L (ref 30–99)
ALT SERPL-CCNC: 5 U/L (ref 2–50)
ANION GAP SERPL CALC-SCNC: 10 MMOL/L (ref 7–16)
AST SERPL-CCNC: 14 U/L (ref 12–45)
BILIRUB SERPL-MCNC: 0.4 MG/DL (ref 0.1–1.5)
BUN SERPL-MCNC: 20 MG/DL (ref 8–22)
CALCIUM SERPL-MCNC: 9.6 MG/DL (ref 8.5–10.5)
CHLORIDE SERPL-SCNC: 105 MMOL/L (ref 96–112)
CHOLEST SERPL-MCNC: 203 MG/DL (ref 100–199)
CO2 SERPL-SCNC: 25 MMOL/L (ref 20–33)
CREAT SERPL-MCNC: 0.71 MG/DL (ref 0.5–1.4)
ERYTHROCYTE [DISTWIDTH] IN BLOOD BY AUTOMATED COUNT: 43.5 FL (ref 35.9–50)
GLOBULIN SER CALC-MCNC: 2.8 G/DL (ref 1.9–3.5)
GLUCOSE SERPL-MCNC: 85 MG/DL (ref 65–99)
HCT VFR BLD AUTO: 46.8 % (ref 37–47)
HDLC SERPL-MCNC: 68 MG/DL
HGB BLD-MCNC: 15.1 G/DL (ref 12–16)
LDLC SERPL CALC-MCNC: 119 MG/DL
MCH RBC QN AUTO: 28.7 PG (ref 27–33)
MCHC RBC AUTO-ENTMCNC: 32.3 G/DL (ref 33.6–35)
MCV RBC AUTO: 89 FL (ref 81.4–97.8)
PLATELET # BLD AUTO: 252 K/UL (ref 164–446)
PMV BLD AUTO: 11.1 FL (ref 9–12.9)
POTASSIUM SERPL-SCNC: 4.5 MMOL/L (ref 3.6–5.5)
PROT SERPL-MCNC: 6.9 G/DL (ref 6–8.2)
RBC # BLD AUTO: 5.26 M/UL (ref 4.2–5.4)
SODIUM SERPL-SCNC: 140 MMOL/L (ref 135–145)
TRIGL SERPL-MCNC: 82 MG/DL (ref 0–149)
WBC # BLD AUTO: 8.8 K/UL (ref 4.8–10.8)

## 2021-02-23 PROCEDURE — 36415 COLL VENOUS BLD VENIPUNCTURE: CPT

## 2021-02-23 PROCEDURE — 80061 LIPID PANEL: CPT

## 2021-02-23 PROCEDURE — 85027 COMPLETE CBC AUTOMATED: CPT

## 2021-02-23 PROCEDURE — 80053 COMPREHEN METABOLIC PANEL: CPT

## 2021-02-26 ENCOUNTER — TELEPHONE (OUTPATIENT)
Dept: MEDICAL GROUP | Facility: MEDICAL CENTER | Age: 61
End: 2021-02-26

## 2021-02-26 NOTE — TELEPHONE ENCOUNTER
1. Caller Name: Mohinder                        Call Back Number: [phone]    Patient called concerned about his Wife, he states his patient used to get out of bed in the mornings really early around 6 or 7 and now for the last Month she has been getting out of bed till 11 or noon, sometimes she waits too much and will pee on the bed, she also has been forgetting conversations. Patient  states patient has been taking gabapentin before bed and also takes tizanidine but he said he doesn't think is the medication causing It but he wanted to let you know what's going on. She is scheduled to see you on 3/5          How would the patient prefer to be contacted with a response: Phone call OK to leave a detailed message

## 2021-03-02 ENCOUNTER — HOSPITAL ENCOUNTER (OUTPATIENT)
Dept: RADIOLOGY | Facility: MEDICAL CENTER | Age: 61
End: 2021-03-02
Attending: INTERNAL MEDICINE
Payer: MEDICARE

## 2021-03-02 ENCOUNTER — HOSPITAL ENCOUNTER (OUTPATIENT)
Dept: RADIOLOGY | Facility: MEDICAL CENTER | Age: 61
DRG: 054 | End: 2021-03-02
Attending: INTERNAL MEDICINE
Payer: MEDICARE

## 2021-03-02 DIAGNOSIS — R91.8 LUNG NODULES: ICD-10-CM

## 2021-03-02 PROCEDURE — 71250 CT THORAX DX C-: CPT

## 2021-03-03 ENCOUNTER — NURSE TRIAGE (OUTPATIENT)
Dept: HEALTH INFORMATION MANAGEMENT | Facility: OTHER | Age: 61
End: 2021-03-03

## 2021-03-03 ENCOUNTER — APPOINTMENT (OUTPATIENT)
Dept: RADIOLOGY | Facility: MEDICAL CENTER | Age: 61
DRG: 054 | End: 2021-03-03
Attending: EMERGENCY MEDICINE
Payer: MEDICARE

## 2021-03-03 ENCOUNTER — HOSPITAL ENCOUNTER (INPATIENT)
Facility: MEDICAL CENTER | Age: 61
LOS: 2 days | DRG: 054 | End: 2021-03-05
Attending: EMERGENCY MEDICINE | Admitting: STUDENT IN AN ORGANIZED HEALTH CARE EDUCATION/TRAINING PROGRAM
Payer: MEDICARE

## 2021-03-03 ENCOUNTER — APPOINTMENT (OUTPATIENT)
Dept: RADIOLOGY | Facility: MEDICAL CENTER | Age: 61
DRG: 054 | End: 2021-03-03
Attending: STUDENT IN AN ORGANIZED HEALTH CARE EDUCATION/TRAINING PROGRAM
Payer: MEDICARE

## 2021-03-03 DIAGNOSIS — R41.82 ALTERED MENTAL STATUS, UNSPECIFIED ALTERED MENTAL STATUS TYPE: ICD-10-CM

## 2021-03-03 DIAGNOSIS — G93.89 BRAIN MASS: ICD-10-CM

## 2021-03-03 LAB
ALBUMIN SERPL BCP-MCNC: 4 G/DL (ref 3.2–4.9)
ALBUMIN/GLOB SERPL: 1.4 G/DL
ALP SERPL-CCNC: 45 U/L (ref 30–99)
ALT SERPL-CCNC: 5 U/L (ref 2–50)
AMMONIA PLAS-SCNC: 17 UMOL/L (ref 11–45)
ANION GAP SERPL CALC-SCNC: 15 MMOL/L (ref 7–16)
AST SERPL-CCNC: 12 U/L (ref 12–45)
BASOPHILS # BLD AUTO: 0.8 % (ref 0–1.8)
BASOPHILS # BLD: 0.07 K/UL (ref 0–0.12)
BILIRUB SERPL-MCNC: 0.4 MG/DL (ref 0.1–1.5)
BUN SERPL-MCNC: 22 MG/DL (ref 8–22)
CALCIUM SERPL-MCNC: 9.7 MG/DL (ref 8.5–10.5)
CHLORIDE SERPL-SCNC: 104 MMOL/L (ref 96–112)
CO2 SERPL-SCNC: 21 MMOL/L (ref 20–33)
CREAT SERPL-MCNC: 0.61 MG/DL (ref 0.5–1.4)
EOSINOPHIL # BLD AUTO: 0.03 K/UL (ref 0–0.51)
EOSINOPHIL NFR BLD: 0.3 % (ref 0–6.9)
ERYTHROCYTE [DISTWIDTH] IN BLOOD BY AUTOMATED COUNT: 41.7 FL (ref 35.9–50)
ETHANOL BLD-MCNC: <10.1 MG/DL (ref 0–10)
GLOBULIN SER CALC-MCNC: 2.8 G/DL (ref 1.9–3.5)
GLUCOSE SERPL-MCNC: 87 MG/DL (ref 65–99)
HCT VFR BLD AUTO: 43.7 % (ref 37–47)
HGB BLD-MCNC: 15.1 G/DL (ref 12–16)
IMM GRANULOCYTES # BLD AUTO: 0.04 K/UL (ref 0–0.11)
IMM GRANULOCYTES NFR BLD AUTO: 0.4 % (ref 0–0.9)
LYMPHOCYTES # BLD AUTO: 1.51 K/UL (ref 1–4.8)
LYMPHOCYTES NFR BLD: 16.6 % (ref 22–41)
MCH RBC QN AUTO: 30 PG (ref 27–33)
MCHC RBC AUTO-ENTMCNC: 34.6 G/DL (ref 33.6–35)
MCV RBC AUTO: 86.9 FL (ref 81.4–97.8)
MONOCYTES # BLD AUTO: 0.49 K/UL (ref 0–0.85)
MONOCYTES NFR BLD AUTO: 5.4 % (ref 0–13.4)
NEUTROPHILS # BLD AUTO: 6.98 K/UL (ref 2–7.15)
NEUTROPHILS NFR BLD: 76.5 % (ref 44–72)
NRBC # BLD AUTO: 0 K/UL
NRBC BLD-RTO: 0 /100 WBC
PLATELET # BLD AUTO: 216 K/UL (ref 164–446)
PMV BLD AUTO: 10.4 FL (ref 9–12.9)
POTASSIUM SERPL-SCNC: 4 MMOL/L (ref 3.6–5.5)
PROT SERPL-MCNC: 6.8 G/DL (ref 6–8.2)
RBC # BLD AUTO: 5.03 M/UL (ref 4.2–5.4)
SODIUM SERPL-SCNC: 140 MMOL/L (ref 135–145)
WBC # BLD AUTO: 9.1 K/UL (ref 4.8–10.8)

## 2021-03-03 PROCEDURE — 700117 HCHG RX CONTRAST REV CODE 255: Performed by: STUDENT IN AN ORGANIZED HEALTH CARE EDUCATION/TRAINING PROGRAM

## 2021-03-03 PROCEDURE — 99285 EMERGENCY DEPT VISIT HI MDM: CPT

## 2021-03-03 PROCEDURE — 70450 CT HEAD/BRAIN W/O DYE: CPT | Mod: MG

## 2021-03-03 PROCEDURE — 99223 1ST HOSP IP/OBS HIGH 75: CPT | Mod: AI | Performed by: STUDENT IN AN ORGANIZED HEALTH CARE EDUCATION/TRAINING PROGRAM

## 2021-03-03 PROCEDURE — 80053 COMPREHEN METABOLIC PANEL: CPT

## 2021-03-03 PROCEDURE — 700105 HCHG RX REV CODE 258: Performed by: STUDENT IN AN ORGANIZED HEALTH CARE EDUCATION/TRAINING PROGRAM

## 2021-03-03 PROCEDURE — 770020 HCHG ROOM/CARE - TELE (206)

## 2021-03-03 PROCEDURE — 85025 COMPLETE CBC W/AUTO DIFF WBC: CPT

## 2021-03-03 PROCEDURE — 82140 ASSAY OF AMMONIA: CPT

## 2021-03-03 PROCEDURE — A9270 NON-COVERED ITEM OR SERVICE: HCPCS | Performed by: STUDENT IN AN ORGANIZED HEALTH CARE EDUCATION/TRAINING PROGRAM

## 2021-03-03 PROCEDURE — 71260 CT THORAX DX C+: CPT | Mod: ME

## 2021-03-03 PROCEDURE — 82077 ASSAY SPEC XCP UR&BREATH IA: CPT

## 2021-03-03 PROCEDURE — 700102 HCHG RX REV CODE 250 W/ 637 OVERRIDE(OP): Performed by: STUDENT IN AN ORGANIZED HEALTH CARE EDUCATION/TRAINING PROGRAM

## 2021-03-03 RX ORDER — AMOXICILLIN 250 MG
2 CAPSULE ORAL 2 TIMES DAILY
Status: DISCONTINUED | OUTPATIENT
Start: 2021-03-03 | End: 2021-03-05 | Stop reason: HOSPADM

## 2021-03-03 RX ORDER — BISACODYL 10 MG
10 SUPPOSITORY, RECTAL RECTAL
Status: DISCONTINUED | OUTPATIENT
Start: 2021-03-03 | End: 2021-03-05 | Stop reason: HOSPADM

## 2021-03-03 RX ORDER — TIZANIDINE 4 MG/1
2 TABLET ORAL
Status: DISCONTINUED | OUTPATIENT
Start: 2021-03-03 | End: 2021-03-05 | Stop reason: HOSPADM

## 2021-03-03 RX ORDER — ACETAMINOPHEN 500 MG
1000 TABLET ORAL 2 TIMES DAILY PRN
COMMUNITY

## 2021-03-03 RX ORDER — SODIUM CHLORIDE 9 MG/ML
INJECTION, SOLUTION INTRAVENOUS CONTINUOUS
Status: DISCONTINUED | OUTPATIENT
Start: 2021-03-03 | End: 2021-03-05 | Stop reason: HOSPADM

## 2021-03-03 RX ORDER — ACETAMINOPHEN 325 MG/1
650 TABLET ORAL EVERY 6 HOURS PRN
Status: DISCONTINUED | OUTPATIENT
Start: 2021-03-03 | End: 2021-03-05 | Stop reason: HOSPADM

## 2021-03-03 RX ORDER — PROMETHAZINE HYDROCHLORIDE 25 MG/1
12.5-25 SUPPOSITORY RECTAL EVERY 4 HOURS PRN
Status: DISCONTINUED | OUTPATIENT
Start: 2021-03-03 | End: 2021-03-05 | Stop reason: HOSPADM

## 2021-03-03 RX ORDER — GABAPENTIN 300 MG/1
300 CAPSULE ORAL 2 TIMES DAILY
Status: DISCONTINUED | OUTPATIENT
Start: 2021-03-03 | End: 2021-03-05 | Stop reason: HOSPADM

## 2021-03-03 RX ORDER — PROMETHAZINE HYDROCHLORIDE 25 MG/1
12.5-25 TABLET ORAL EVERY 4 HOURS PRN
Status: DISCONTINUED | OUTPATIENT
Start: 2021-03-03 | End: 2021-03-05 | Stop reason: HOSPADM

## 2021-03-03 RX ORDER — ONDANSETRON 4 MG/1
4 TABLET, ORALLY DISINTEGRATING ORAL EVERY 4 HOURS PRN
Status: DISCONTINUED | OUTPATIENT
Start: 2021-03-03 | End: 2021-03-05 | Stop reason: HOSPADM

## 2021-03-03 RX ORDER — PROCHLORPERAZINE EDISYLATE 5 MG/ML
5-10 INJECTION INTRAMUSCULAR; INTRAVENOUS EVERY 4 HOURS PRN
Status: DISCONTINUED | OUTPATIENT
Start: 2021-03-03 | End: 2021-03-05 | Stop reason: HOSPADM

## 2021-03-03 RX ORDER — POLYETHYLENE GLYCOL 3350 17 G/17G
1 POWDER, FOR SOLUTION ORAL
Status: DISCONTINUED | OUTPATIENT
Start: 2021-03-03 | End: 2021-03-05 | Stop reason: HOSPADM

## 2021-03-03 RX ORDER — TIZANIDINE 2 MG/1
TABLET ORAL
COMMUNITY
Start: 2021-01-12 | End: 2021-03-03

## 2021-03-03 RX ORDER — ONDANSETRON 2 MG/ML
4 INJECTION INTRAMUSCULAR; INTRAVENOUS EVERY 4 HOURS PRN
Status: DISCONTINUED | OUTPATIENT
Start: 2021-03-03 | End: 2021-03-05 | Stop reason: HOSPADM

## 2021-03-03 RX ADMIN — SODIUM CHLORIDE: 9 INJECTION, SOLUTION INTRAVENOUS at 23:36

## 2021-03-03 RX ADMIN — IOHEXOL 80 ML: 350 INJECTION, SOLUTION INTRAVENOUS at 23:06

## 2021-03-03 RX ADMIN — GABAPENTIN 300 MG: 300 CAPSULE ORAL at 23:35

## 2021-03-03 ASSESSMENT — ENCOUNTER SYMPTOMS
GASTROINTESTINAL NEGATIVE: 1
WEAKNESS: 1
HEADACHES: 1
FALLS: 1
CARDIOVASCULAR NEGATIVE: 1
EYES NEGATIVE: 1
RESPIRATORY NEGATIVE: 1

## 2021-03-03 ASSESSMENT — COGNITIVE AND FUNCTIONAL STATUS - GENERAL
DRESSING REGULAR LOWER BODY CLOTHING: A LITTLE
SUGGESTED CMS G CODE MODIFIER MOBILITY: CK
HELP NEEDED FOR BATHING: A LITTLE
MOVING FROM LYING ON BACK TO SITTING ON SIDE OF FLAT BED: A LITTLE
MOVING TO AND FROM BED TO CHAIR: A LITTLE
MOBILITY SCORE: 19
WALKING IN HOSPITAL ROOM: A LITTLE
STANDING UP FROM CHAIR USING ARMS: A LITTLE
CLIMB 3 TO 5 STEPS WITH RAILING: A LITTLE

## 2021-03-03 ASSESSMENT — FIBROSIS 4 INDEX
FIB4 SCORE: 1.52
FIB4 SCORE: 1.52

## 2021-03-03 NOTE — TELEPHONE ENCOUNTER
"  Reason for Disposition  • Very strange or paranoid behavior    Additional Information  • Negative: Difficult to awaken or acting confused (disoriented, slurred speech) and has diabetes  • Negative: Difficult to awaken or acting confused (disoriented, slurred speech) and new onset  • Negative: Weakness of the face, arm, or leg on one side of the body and new onset  • Negative: Numbness of the face, arm, or leg on one side of the body and new onset  • Negative: Loss of speech or garbled speech and new onset  • Negative: Difficulty breathing and bluish (or gray) lips or face  • Negative: Shock suspected (e.g., cold/pale/clammy skin, too weak to stand, low BP, rapid pulse)  • Negative: Seeing or hearing or feeling things that are not there (i.e., auditory, visual, or tactile hallucinations)  • Negative: Followed a head injury  • Negative: Drug overdose suspected  • Negative: Sounds like a life-threatening emergency to the triager  • Negative: Alcohol use, abuse or dependence: question or problem related to  • Negative: Drug abuse or dependence: question or problem related to  • Negative: Stiff neck (can't touch chin to chest)  • Negative: Headache or vomiting    Answer Assessment - Initial Assessment Questions  1. LEVEL OF CONSCIOUSNESS: \"How is he (she, the patient) acting right now?\" (e.g., alert-oriented, confused, lethargic, stuporous, comatose)      Sl confused and attn span   2. ONSET: \"When did the confusion start?\"  (minutes, hours, days)      2 weeks ago but worsening. Today staying in bed  3. PATTERN \"Does this come and go, or has it been constant since it started?\"  \"Is it present now?\"      Comes and goes  4. ALCOHOL or DRUGS: \"Has he been drinking alcohol or taking any drugs?\"       no  5. NARCOTIC MEDICATIONS: \"Has he been receiving any narcotic medications?\" (e.g., morphine, Vicodin)      gabapentin  6. CAUSE: \"What do you think is causing the confusion?\"       Unsure, family dementia  7. OTHER " "SYMPTOMS: \"Are there any other symptoms?\" (e.g., difficulty breathing, headache, fever, weakness)      Headache, increased weakness    Protocols used: CONFUSION - DELIRIUM-A-OH    "

## 2021-03-03 NOTE — TELEPHONE ENCOUNTER
----- Message from Dave Bourgeois sent at 3/3/2021  2:47 PM PST -----  liana called needs advice pt still in bed, ct scan last night still no word from PCP

## 2021-03-04 ENCOUNTER — APPOINTMENT (OUTPATIENT)
Dept: RADIOLOGY | Facility: MEDICAL CENTER | Age: 61
DRG: 054 | End: 2021-03-04
Attending: STUDENT IN AN ORGANIZED HEALTH CARE EDUCATION/TRAINING PROGRAM
Payer: MEDICARE

## 2021-03-04 LAB
ALBUMIN SERPL BCP-MCNC: 3.6 G/DL (ref 3.2–4.9)
ALBUMIN/GLOB SERPL: 1.3 G/DL
ALP SERPL-CCNC: 40 U/L (ref 30–99)
ALT SERPL-CCNC: <5 U/L (ref 2–50)
ANION GAP SERPL CALC-SCNC: 12 MMOL/L (ref 7–16)
APPEARANCE UR: CLEAR
AST SERPL-CCNC: 10 U/L (ref 12–45)
BACTERIA #/AREA URNS HPF: NEGATIVE /HPF
BASOPHILS # BLD AUTO: 0.9 % (ref 0–1.8)
BASOPHILS # BLD: 0.08 K/UL (ref 0–0.12)
BILIRUB SERPL-MCNC: 0.3 MG/DL (ref 0.1–1.5)
BILIRUB UR QL STRIP.AUTO: NEGATIVE
BUN SERPL-MCNC: 23 MG/DL (ref 8–22)
CALCIUM SERPL-MCNC: 9.1 MG/DL (ref 8.5–10.5)
CHLORIDE SERPL-SCNC: 103 MMOL/L (ref 96–112)
CO2 SERPL-SCNC: 23 MMOL/L (ref 20–33)
COLOR UR: YELLOW
CREAT SERPL-MCNC: 0.61 MG/DL (ref 0.5–1.4)
EOSINOPHIL # BLD AUTO: 0.04 K/UL (ref 0–0.51)
EOSINOPHIL NFR BLD: 0.5 % (ref 0–6.9)
EPI CELLS #/AREA URNS HPF: NEGATIVE /HPF
ERYTHROCYTE [DISTWIDTH] IN BLOOD BY AUTOMATED COUNT: 41.4 FL (ref 35.9–50)
GLOBULIN SER CALC-MCNC: 2.7 G/DL (ref 1.9–3.5)
GLUCOSE SERPL-MCNC: 158 MG/DL (ref 65–99)
GLUCOSE UR STRIP.AUTO-MCNC: NEGATIVE MG/DL
HCT VFR BLD AUTO: 39.1 % (ref 37–47)
HGB BLD-MCNC: 13.4 G/DL (ref 12–16)
HYALINE CASTS #/AREA URNS LPF: ABNORMAL /LPF
IMM GRANULOCYTES # BLD AUTO: 0.03 K/UL (ref 0–0.11)
IMM GRANULOCYTES NFR BLD AUTO: 0.4 % (ref 0–0.9)
KETONES UR STRIP.AUTO-MCNC: ABNORMAL MG/DL
LEUKOCYTE ESTERASE UR QL STRIP.AUTO: NEGATIVE
LYMPHOCYTES # BLD AUTO: 1.51 K/UL (ref 1–4.8)
LYMPHOCYTES NFR BLD: 17.7 % (ref 22–41)
MAGNESIUM SERPL-MCNC: 1.9 MG/DL (ref 1.5–2.5)
MCH RBC QN AUTO: 29.8 PG (ref 27–33)
MCHC RBC AUTO-ENTMCNC: 34.3 G/DL (ref 33.6–35)
MCV RBC AUTO: 86.9 FL (ref 81.4–97.8)
MICRO URNS: ABNORMAL
MONOCYTES # BLD AUTO: 0.47 K/UL (ref 0–0.85)
MONOCYTES NFR BLD AUTO: 5.5 % (ref 0–13.4)
NEUTROPHILS # BLD AUTO: 6.41 K/UL (ref 2–7.15)
NEUTROPHILS NFR BLD: 75 % (ref 44–72)
NITRITE UR QL STRIP.AUTO: NEGATIVE
NRBC # BLD AUTO: 0 K/UL
NRBC BLD-RTO: 0 /100 WBC
PH UR STRIP.AUTO: 5.5 [PH] (ref 5–8)
PHOSPHATE SERPL-MCNC: 3.1 MG/DL (ref 2.5–4.5)
PLATELET # BLD AUTO: 206 K/UL (ref 164–446)
PMV BLD AUTO: 10.6 FL (ref 9–12.9)
POTASSIUM SERPL-SCNC: 3.8 MMOL/L (ref 3.6–5.5)
PROT SERPL-MCNC: 6.3 G/DL (ref 6–8.2)
PROT UR QL STRIP: NEGATIVE MG/DL
RBC # BLD AUTO: 4.5 M/UL (ref 4.2–5.4)
RBC # URNS HPF: ABNORMAL /HPF
RBC UR QL AUTO: ABNORMAL
SODIUM SERPL-SCNC: 138 MMOL/L (ref 135–145)
SP GR UR STRIP.AUTO: 1.02
TSH SERPL DL<=0.005 MIU/L-ACNC: 1.33 UIU/ML (ref 0.38–5.33)
UROBILINOGEN UR STRIP.AUTO-MCNC: 0.2 MG/DL
WBC # BLD AUTO: 8.5 K/UL (ref 4.8–10.8)
WBC #/AREA URNS HPF: ABNORMAL /HPF

## 2021-03-04 PROCEDURE — A9576 INJ PROHANCE MULTIPACK: HCPCS | Performed by: STUDENT IN AN ORGANIZED HEALTH CARE EDUCATION/TRAINING PROGRAM

## 2021-03-04 PROCEDURE — 80053 COMPREHEN METABOLIC PANEL: CPT

## 2021-03-04 PROCEDURE — 700111 HCHG RX REV CODE 636 W/ 250 OVERRIDE (IP): Performed by: STUDENT IN AN ORGANIZED HEALTH CARE EDUCATION/TRAINING PROGRAM

## 2021-03-04 PROCEDURE — 97161 PT EVAL LOW COMPLEX 20 MIN: CPT

## 2021-03-04 PROCEDURE — 700117 HCHG RX CONTRAST REV CODE 255: Performed by: STUDENT IN AN ORGANIZED HEALTH CARE EDUCATION/TRAINING PROGRAM

## 2021-03-04 PROCEDURE — 83735 ASSAY OF MAGNESIUM: CPT

## 2021-03-04 PROCEDURE — 97165 OT EVAL LOW COMPLEX 30 MIN: CPT

## 2021-03-04 PROCEDURE — 70553 MRI BRAIN STEM W/O & W/DYE: CPT | Mod: MG

## 2021-03-04 PROCEDURE — A9270 NON-COVERED ITEM OR SERVICE: HCPCS | Performed by: STUDENT IN AN ORGANIZED HEALTH CARE EDUCATION/TRAINING PROGRAM

## 2021-03-04 PROCEDURE — 81001 URINALYSIS AUTO W/SCOPE: CPT

## 2021-03-04 PROCEDURE — 84443 ASSAY THYROID STIM HORMONE: CPT

## 2021-03-04 PROCEDURE — 700102 HCHG RX REV CODE 250 W/ 637 OVERRIDE(OP): Performed by: STUDENT IN AN ORGANIZED HEALTH CARE EDUCATION/TRAINING PROGRAM

## 2021-03-04 PROCEDURE — 84100 ASSAY OF PHOSPHORUS: CPT

## 2021-03-04 PROCEDURE — 99233 SBSQ HOSP IP/OBS HIGH 50: CPT | Performed by: STUDENT IN AN ORGANIZED HEALTH CARE EDUCATION/TRAINING PROGRAM

## 2021-03-04 PROCEDURE — 700105 HCHG RX REV CODE 258: Performed by: STUDENT IN AN ORGANIZED HEALTH CARE EDUCATION/TRAINING PROGRAM

## 2021-03-04 PROCEDURE — 36415 COLL VENOUS BLD VENIPUNCTURE: CPT

## 2021-03-04 PROCEDURE — 770020 HCHG ROOM/CARE - TELE (206)

## 2021-03-04 PROCEDURE — 85025 COMPLETE CBC W/AUTO DIFF WBC: CPT

## 2021-03-04 RX ADMIN — GABAPENTIN 300 MG: 300 CAPSULE ORAL at 05:07

## 2021-03-04 RX ADMIN — ENOXAPARIN SODIUM 40 MG: 40 INJECTION SUBCUTANEOUS at 05:07

## 2021-03-04 RX ADMIN — GABAPENTIN 300 MG: 300 CAPSULE ORAL at 17:14

## 2021-03-04 RX ADMIN — SODIUM CHLORIDE: 9 INJECTION, SOLUTION INTRAVENOUS at 12:14

## 2021-03-04 RX ADMIN — GADOTERIDOL 10 ML: 279.3 INJECTION, SOLUTION INTRAVENOUS at 21:37

## 2021-03-04 ASSESSMENT — COGNITIVE AND FUNCTIONAL STATUS - GENERAL
TOILETING: A LOT
HELP NEEDED FOR BATHING: A LOT
MOVING FROM LYING ON BACK TO SITTING ON SIDE OF FLAT BED: A LITTLE
EATING MEALS: A LITTLE
DRESSING REGULAR UPPER BODY CLOTHING: A LITTLE
PERSONAL GROOMING: A LITTLE
CLIMB 3 TO 5 STEPS WITH RAILING: A LOT
SUGGESTED CMS G CODE MODIFIER DAILY ACTIVITY: CK
SUGGESTED CMS G CODE MODIFIER MOBILITY: CL
MOVING TO AND FROM BED TO CHAIR: UNABLE
STANDING UP FROM CHAIR USING ARMS: A LITTLE
DRESSING REGULAR LOWER BODY CLOTHING: A LOT
WALKING IN HOSPITAL ROOM: A LITTLE
MOBILITY SCORE: 13
TURNING FROM BACK TO SIDE WHILE IN FLAT BAD: UNABLE
DAILY ACTIVITIY SCORE: 15

## 2021-03-04 ASSESSMENT — ENCOUNTER SYMPTOMS
NEUROLOGICAL NEGATIVE: 1
CONSTITUTIONAL NEGATIVE: 1
PSYCHIATRIC NEGATIVE: 1
CARDIOVASCULAR NEGATIVE: 1
MUSCULOSKELETAL NEGATIVE: 1
EYES NEGATIVE: 1
GASTROINTESTINAL NEGATIVE: 1
RESPIRATORY NEGATIVE: 1

## 2021-03-04 ASSESSMENT — GAIT ASSESSMENTS
ASSISTIVE DEVICE: FRONT WHEEL WALKER
DISTANCE (FEET): 100
GAIT LEVEL OF ASSIST: MINIMAL ASSIST

## 2021-03-04 ASSESSMENT — PAIN DESCRIPTION - PAIN TYPE
TYPE: ACUTE PAIN
TYPE: ACUTE PAIN

## 2021-03-04 ASSESSMENT — ACTIVITIES OF DAILY LIVING (ADL): TOILETING: INDEPENDENT

## 2021-03-04 NOTE — ED PROVIDER NOTES
ED Provider Note    Scribed for Tello Cai M.D. by Tello Cai M.D.. 3/3/2021,  8:14 PM.    CHIEF COMPLAINT  Chief Complaint   Patient presents with   • Altered Mental Status     Onset x 1 month with exacerbation over the last three months. Pt has become incontinent at night. Pt states states she is not altered. Pt's grand-daughter states her cognition has diminished greatly.       HPI  Cassandra Bartlett is a 60 y.o. female who presents to the Emergency Department brought in by family out of concern for altered mental status for the last 4 to 6 weeks.  She is accompanied by her  who says that she is simply not herself.  Her energy is low, she is forgetting conversations, she seems confused, has very low energy, is not getting out of bed, and is sometimes incontinent.  He reports that this is an abrupt mental status and energy and activity changed over the course of 4 to 6 weeks.  She has had no medication changes.  She is on the same doses of gabapentin and tenacity and as she has been for years.  She often skips doses because they make her feel drowsy.  She has not had fevers or chills, nausea or vomiting, chest pain or cough or shortness of breath or dysuria.  She denies any recreational drugs.  They have been in communication with their primary care provider, and there was some very basic lab work done within the last week, but limited in terms of lab evaluation of altered mental status.  For unclear reasons, CT of the chest was performed yesterday, but not of the head.  There were several lung nodules noted on the chest CT.    Caveat: The patient's history and review of systems is somewhat limited by her altered mental status.  All history is obtained from her .    REVIEW OF SYSTEMS  See HPI for further details. All other systems are negative.     PAST MEDICAL HISTORY   has a past medical history of Back pain and Chickenpox.    SOCIAL HISTORY  Social History     Tobacco Use   •  "Smoking status: Current Every Day Smoker     Packs/day: 1.00     Years: 43.00     Pack years: 43.00     Types: Cigarettes   • Smokeless tobacco: Never Used   • Tobacco comment: started at 15   Substance and Sexual Activity   • Alcohol use: No   • Drug use: No   • Sexual activity: Yes     Partners: Male     Social History     Substance and Sexual Activity   Drug Use No       SURGICAL HISTORY   has a past surgical history that includes gyn surgery; breast augmentation with implant; laminotomy; arthroscopy, knee; and other.    CURRENT MEDICATIONS  Home Medications     Reviewed by Joelle Toribio R.N. (Registered Nurse) on 03/05/21 at 1900  Med List Status: Complete   Medication Last Dose Status   acetaminophen (TYLENOL) 500 MG Tab 3/2/2021 Active   ibuprofen (MOTRIN) 600 MG Tab >7 days ago Active                ALLERGIES  Allergies   Allergen Reactions   • Codeine    • Omnicef [Cefdinir] Hives       PHYSICAL EXAM  VITAL SIGNS: /59   Pulse 75   Temp 36.3 °C (97.4 °F) (Temporal)   Resp 17   Ht 1.702 m (5' 7\")   Wt 50 kg (110 lb 3.7 oz)   LMP  (LMP Unknown)   SpO2 96%   BMI 17.26 kg/m²   Pulse ox interpretation: I interpret this pulse ox as normal.  Constitutional: Alert, though seems withdrawn.  No distress.  HENT: No signs of trauma, Bilateral external ears normal, Nose normal.   Eyes: Conjunctiva normal, Non-icteric.   Neck: Normal range of motion, Supple, No stridor.   Lymphatic: No lymphadenopathy noted.   Cardiovascular: Regular rate and rhythm, no murmurs.   Thorax & Lungs: Normal breath sounds, No respiratory distress, No wheezing, No chest tenderness.   Abdomen: Bowel sounds normal, Soft, No tenderness, No masses, No pulsatile masses. No peritoneal signs.  Skin: Warm, Dry, No erythema, No rash.   Extremities: Intact distal pulses, No edema, No cyanosis.  Musculoskeletal: Good range of motion in all major joints. No or major deformities noted.   Neurologic: Alert , Normal motor function, Normal " sensory function, No focal deficits noted.   Psychiatric: Affect flat, withdrawn, distracted.  No suicidal or homicidal thoughts.  No evidence of psychosis or responding to internal stimuli.    DIAGNOSTIC STUDIES / PROCEDURES      LABS  Labs Reviewed   CBC WITH DIFFERENTIAL - Abnormal; Notable for the following components:       Result Value    Neutrophils-Polys 76.50 (*)     Lymphocytes 16.60 (*)     All other components within normal limits   URINALYSIS,CULTURE IF INDICATED - Abnormal; Notable for the following components:    Ketones Trace (*)     Occult Blood Trace (*)     All other components within normal limits    Narrative:     Indication for culture:->Acute unexplained altered mental  status ONLY after ruling out other recognized cause   COMP METABOLIC PANEL - Abnormal; Notable for the following components:    Glucose 158 (*)     Bun 23 (*)     AST(SGOT) 10 (*)     All other components within normal limits   CBC WITH DIFFERENTIAL - Abnormal; Notable for the following components:    Neutrophils-Polys 75.00 (*)     Lymphocytes 17.70 (*)     All other components within normal limits   URINE MICROSCOPIC (W/UA) - Abnormal; Notable for the following components:    WBC 5-10 (*)     All other components within normal limits    Narrative:     Indication for culture:->Acute unexplained altered mental  status ONLY after ruling out other recognized cause   COMP METABOLIC PANEL - Abnormal; Notable for the following components:    Glucose 108 (*)     Creatinine 0.47 (*)     AST(SGOT) 8 (*)     Total Protein 5.9 (*)     All other components within normal limits   COMP METABOLIC PANEL   AMMONIA   DIAGNOSTIC ALCOHOL   ESTIMATED GFR   MAGNESIUM   PHOSPHORUS   TSH   ESTIMATED GFR   CBC WITHOUT DIFFERENTIAL   ESTIMATED GFR     All labs reviewed by me.    RADIOLOGY                                                                                               CT-HEAD W/O   Final Result         1.  Large bifrontal intracranial mass  with vasogenic edema and associated mass effect. Mass likely originates in the right hemisphere and crosses the midline into the left hemisphere. Appearance most compatible with intracranial neoplasia. Recommend    follow-up evaluation with MRI of brain without and with contrast   2.  Right to left midline shift      These findings were discussed with the patient's clinician, Tello Cai, on 3/3/2021 9:23 PM.        The radiologist's interpretation of all radiological studies have been reviewed by me.    COURSE & MEDICAL DECISION MAKING  Nursing notes, VS, PMSFHx reviewed in chart.     8:14 PM Patient seen and examined at bedside. Differential diagnosis includes but is not limited to altered mental status secondary to organic pathology such as brain mass, dehydration, electrolyte abnormality, thyroid disorder, infection, substance abuse or prescription medication misuse, or secondary to a psychiatric condition. Ordered for CBC, CMP, ammonia, diagnostic alcohol, urinalysis, TSH, head CT to evaluate.     9:35 PM a few minutes ago I spoke with Dr. Starr, the reading radiologist who reports a right frontal lobe mass, suspicious for glioblastoma multiforme.  I have paged neurosurgery and the hospitalist service.    9:55 PM Dr. Arizmendi, neurosurgery, agrees to consult. His team will see her tomorrow unless there are emergent concerns.    10:10 PM Dr. Solano agrees to admit. We discussed that the patient is midlyl confused, but not in extremis or compative or unstable, and is likely stable for the floor.     DISPOSITION:  Patient will be admitted to the hospitalsit service in stable condition.      FINAL IMPRESSION  1. Brain mass    2. Altered mental status, unspecified altered mental status type

## 2021-03-04 NOTE — ASSESSMENT & PLAN NOTE
CTH:  IMPRESSION:  1.  Large bifrontal intracranial mass with vasogenic edema and associated mass effect. Mass likely originates in the right hemisphere and crosses the midline into the left hemisphere. Appearance most compatible with intracranial neoplasia. Recommend   follow-up evaluation with MRI of brain without and with contrast  2.  Right to left midline shift    Concerning for GBM.  Neurosurgery consulted in the ED.  Follow-up with their recommendations.  Consult oncology in the a.m.  Further work-up including MRI brain/CT chest/A/P ordered.

## 2021-03-04 NOTE — THERAPY
"Occupational Therapy   Initial Evaluation     Patient Name: Cassandra Bartlett  Age:  60 y.o., Sex:  female  Medical Record #: [id number]  Today's Date: 3/4/2021     Precautions  Precautions: Fall Risk  Comments: new brain mass    Assessment  Patient is 60 y.o. female with a diagnosis of new bifrontal cranial mass concerning for GBM per chart. Awaiting further imaging to determine POC - surgical vs. Non-surgical.  Additional factors influencing patient status / progress: weakness, fatigue, impaired balance, impaired cognition.      Plan    Recommend Occupational Therapy 3 times per week until therapy goals are met for the following treatments:  Adaptive Equipment, Neuro Re-Education / Balance, Self Care/Activities of Daily Living, Therapeutic Activities and Therapeutic Exercises.    DC Equipment Recommendations: Unable to determine at this time  Discharge Recommendations: Recommend post-acute placement for additional occupational therapy services prior to discharge home     Subjective    \"I've never fallen but my mom has\"     Objective       03/04/21 1111   Prior Living Situation   Prior Services Home-Independent   Bathroom Set up Bathtub / Shower Combination;Grab Bars;Shower Chair   Equipment Owned Tub / Shower Seat;Grab Bar(s) In Tub / Shower   Lives with - Patient's Self Care Capacity Spouse   Comments Pt reports she is normally taking care of her mother.   Prior Level of ADL Function   Self Feeding Independent   Grooming / Hygiene Independent   Bathing Independent   Dressing Independent   Toileting Independent   Prior Level of IADL Function   Medication Management Independent   Laundry Independent   Kitchen Mobility Independent   Finances Independent   Home Management Independent   Shopping Independent   Prior Level Of Mobility Independent Without Device in Community;Independent Without Device in Home   Driving / Transportation Driving Independent   Cognition    Cognition / Consciousness X   Speech/ Communication " Delayed Responses   Level of Consciousness Alert   Ability To Follow Commands 1 Step   Safety Awareness Impaired;Impulsive   New Learning Impaired   Attention Impaired   Sequencing Impaired   Initiation Impaired   Comments pleasant and cooperative, flat affect. needs moderate cueing to complete tasks. difficulty initiating ADLs w/o instruction. impaired insight into current deficits   Balance Assessment   Sitting Balance (Static) Fair -   Sitting Balance (Dynamic) Poor +   Standing Balance (Static) Poor +   Standing Balance (Dynamic) Poor   Weight Shift Sitting Fair   Weight Shift Standing Fair   Comments w/ FWW, posterior lean sitting EOB; 2 LOB in BR requiring steadying from therapist   Bed Mobility    Supine to Sit Minimal Assist   Sit to Supine Minimal Assist   Scooting Minimal Assist   ADL Assessment   Grooming Minimal Assist;Standing  (oral care, wash hands)   Upper Body Dressing Minimal Assist  (don/doff gown)   Toileting Moderate Assist  (incontinent BM)   Functional Mobility   Sit to Stand Minimal Assist   Bed, Chair, Wheelchair Transfer Minimal Assist   Toilet Transfers Minimal Assist   Mobility EOB>BR>BTB   Comments w/ FWW   Activity Tolerance   Comments RN  notified therapist pt's HR up to 130s sustaining, assisted back to bed came back down to 85 SR. Pt asymptomatic when tachy   Patient / Family Goals   Patient / Family Goal #1 to go home   Short Term Goals   Short Term Goal # 1 pt will demo toilet txf with supv   Short Term Goal # 2 pt will demo toileting w/ supv   Short Term Goal # 3 pt will groom in stance with supv   Short Term Goal # 4 pt will follow 2 step direction 75% of time w/o cues

## 2021-03-04 NOTE — CARE PLAN
Problem: Nutritional:  Goal: Achieve adequate nutritional intake  Description: Patient will consume >50% of meals/supplements  Outcome: NOT MET   See RD note for details. RD following.

## 2021-03-04 NOTE — THERAPY
"Physical Therapy   Initial Evaluation     Patient Name: Cassandra Bartlett  Age:  60 y.o., Sex:  female  Medical Record #: [id number]  Today's Date: 3/4/2021     Precautions: Fall Risk    Assessment  Patient is 60 y.o. female admitted w/ AMS x one month.  Poor PO intake.  Spouse at bedside.  Pt unable to accurately answer questions.  Spouse reports, pt lives in a single story house, spouse works days.  Pt takes care of her mother.  House has ramp access.  Per spouse, pt was usually indep w/ mobility, until recently, when she began sleeping until noon or one, until spouse \"got her out of bed\".  CT indicates large bifrontal mass, possibly a glio.  Right to left midline shift.  Today, she is rec'd in bed.  Delayed responses and incorrect answers to questions.  Mod assist to sit eob w/ posterior weight shift, unable to self correct.  Min assist to stand, also w/ posterior weight shift, needing min assist and fww to correct.  She is able to ambulate w/ fww and min assist.  PT will follow and address her impaired mobility, in hopes of restoring her mobility.    Plan    Recommend Physical Therapy 3 times per week until therapy goals are met for the following treatments:  Bed Mobility, Gait Training, Neuro Re-Education / Balance and Therapeutic Activities    DC Equipment Recommendations: Unable to determine at this time  Discharge Recommendations: Recommend post-acute placement for additional physical therapy services prior to discharge home                Objective       03/04/21 1309   Prior Living Situation   Housing / Facility 1 Story House   Steps Into Home 0   Steps In Home 0   Equipment Owned Ramp;Front-Wheel Walker   Lives with - Patient's Self Care Capacity Spouse  (pts mother)   Prior Level of Functional Mobility   Bed Mobility Independent   Transfer Status Independent   Ambulation Independent   Assistive Devices Used None   Cognition    Speech/ Communication Delayed Responses   Safety Awareness Impaired   New " Learning Impaired   Attention Impaired   Sequencing Impaired   Initiation Impaired   Strength Lower Body   Comments grossly wnl   Balance Assessment   Sitting Balance (Static) Fair -   Sitting Balance (Dynamic) Poor +   Standing Balance (Static) Poor -   Standing Balance (Dynamic) Poor -   Weight Shift Sitting Fair   Weight Shift Standing Fair   Comments w/ fww   Gait Analysis   Gait Level Of Assist Minimal Assist   Assistive Device Front Wheel Walker   Distance (Feet) 100   Deviation   (posterior weight shift)   Bed Mobility    Supine to Sit Moderate Assist   Sit to Supine Supervised   Functional Mobility   Sit to Stand Minimal Assist   Short Term Goals    Short Term Goal # 1 Pt to move supine to/from eob w/ spv in 6 visits to improve fxl indep   Short Term Goal # 2 Pt to move sit to/from stand w/ spv in 6 visits to improve fxl indep   Short Term Goal # 3 Pt to ambulate 150 ft w/ spv in 6 visits to improve fxl indep   Anticipated Discharge Equipment and Recommendations   DC Equipment Recommendations Unable to determine at this time   Discharge Recommendations Recommend post-acute placement for additional physical therapy services prior to discharge home

## 2021-03-04 NOTE — PROGRESS NOTES
[facility] Medicine Daily Progress Note    Date of Service  3/4/2021    Chief Complaint  60 y.o. female with PMHx of Peripheral Neuropathy admitted 3/3/2021 with Altered Mental Status.    Hospital Course  Cassandra Bartlett is a 60 y.o. female who presents to the Emergency Department brought in by family for evaluation for altered mental status for the last 4 to 6 weeks. She is accompanied by her  who says that she is simply not herself. She has had episodes of generalized weakness, apathy, forgetfulness, intermittent confusion and sometimes very low energy to get out of bed. She also had poor p.o. intake, dehydration and sometimes forgetting to eat her meals. He reports that this is an abrupt activity changed over the course of 4 to 6 weeks. She has not had fevers or chills, nausea or vomiting, chest pain or cough or shortness of breath or dysuria. She denies any recreational drugs. They have been in communication with her primary care provider, and for some reasons, CT of the chest was performed yesterday, which revealed several lung nodules.      CTH in the emergency room revealed a large bifrontal cranial mass with vasogenic edema and associated mass-effect. Right to left midline shift. Findings concerning for glioblastoma multiforme. Neurosurgery-Dr. Arizmendi consulted in the ED.  Further oncology work-up including MRI of the brain, CT chest/A/P ordered.  She will be admitted to neuro/telemetry for further evaluation.    Interval Problem Update  Patient evaluated at bedside  In no acute distress  No overnight complaints  Follow up Neurosurgery Dr. Arizmendi official recommendations  Follow up Brain MRI      Consultants/Specialty  Neurosurgery-Dr. Arizmendi    Code Status  Full Code    Disposition  TBD    Review of Systems  Review of Systems   Constitutional: Negative.    HENT: Negative.    Eyes: Negative.    Respiratory: Negative.    Cardiovascular: Negative.    Gastrointestinal: Negative.    Genitourinary:  Negative.    Musculoskeletal: Negative.    Skin: Negative.    Neurological: Negative.    Endo/Heme/Allergies: Negative.    Psychiatric/Behavioral: Negative.         Physical Exam  Temp:  [36.3 °C (97.4 °F)-37.2 °C (98.9 °F)] 36.5 °C (97.7 °F)  Pulse:  [69-92] 69  Resp:  [14-23] 15  BP: (115-142)/(56-72) 115/61  SpO2:  [92 %-96 %] 96 %    Physical Exam  Vitals reviewed.   HENT:      Head: Normocephalic and atraumatic.      Right Ear: External ear normal.      Left Ear: External ear normal.      Nose: Nose normal.      Mouth/Throat:      Mouth: Mucous membranes are moist.   Eyes:      Pupils: Pupils are equal, round, and reactive to light.   Cardiovascular:      Rate and Rhythm: Normal rate and regular rhythm.      Pulses: Normal pulses.      Heart sounds: Normal heart sounds.   Pulmonary:      Effort: Pulmonary effort is normal.      Breath sounds: Normal breath sounds.   Abdominal:      General: Abdomen is flat.   Musculoskeletal:         General: Normal range of motion.      Cervical back: Neck supple.   Skin:     General: Skin is warm.      Capillary Refill: Capillary refill takes less than 2 seconds.   Neurological:      General: No focal deficit present.      Mental Status: She is alert.   Psychiatric:         Mood and Affect: Mood normal.         Fluids  No intake or output data in the 24 hours ending 03/04/21 0810    Laboratory  Recent Labs     03/03/21 2045 03/04/21  0052   WBC 9.1 8.5   RBC 5.03 4.50   HEMOGLOBIN 15.1 13.4   HEMATOCRIT 43.7 39.1   MCV 86.9 86.9   MCH 30.0 29.8   MCHC 34.6 34.3   RDW 41.7 41.4   PLATELETCT 216 206   MPV 10.4 10.6     Recent Labs     03/03/21 2045 03/04/21  0052   SODIUM 140 138   POTASSIUM 4.0 3.8   CHLORIDE 104 103   CO2 21 23   GLUCOSE 87 158*   BUN 22 23*   CREATININE 0.61 0.61   CALCIUM 9.7 9.1                   Imaging  CT-CHEST,ABDOMEN,PELVIS WITH   Final Result         1.  No acute abnormality.   2.  Left nephrolithiasis without visualized obstructive changes.   3.   Hepatomegaly   4.  Atherosclerosis and atherosclerotic coronary artery disease.   5.  Multiple numerous bilateral subcentimeter pulmonary nodules measuring up to 5.8 mm.      Fleischner Society pulmonary nodule recommendations:      Low Risk: No routine follow-up      High Risk: Optional CT at 12 months      Comments: Use most suspicious nodule as guide to management. Follow-up intervals may vary according to size and risk.      Low Risk - Minimal or absent history of smoking and of other known risk factors.      High Risk - History of smoking or of other known risk factors.      Note: These recommendations do not apply to lung cancer screening, patients with immunosuppression, or patients with known primary cancer.      Fleischner Society 2017 Guidelines for Management of Incidentally Detected Pulmonary Nodules in Adults      CT-HEAD W/O   Final Result         1.  Large bifrontal intracranial mass with vasogenic edema and associated mass effect. Mass likely originates in the right hemisphere and crosses the midline into the left hemisphere. Appearance most compatible with intracranial neoplasia. Recommend    follow-up evaluation with MRI of brain without and with contrast   2.  Right to left midline shift      These findings were discussed with the patient's clinician, Tello Cai, on 3/3/2021 9:23 PM.      MR-BRAIN-WITH & W/O    (Results Pending)        Assessment/Plan  * Brain mass  Assessment & Plan  CTH:  IMPRESSION:  1.  Large bifrontal intracranial mass with vasogenic edema and associated mass effect. Mass likely originates in the right hemisphere and crosses the midline into the left hemisphere. Appearance most compatible with intracranial neoplasia. Recommend   follow-up evaluation with MRI of brain without and with contrast  2.  Right to left midline shift    Concerning for GBM.  Neurosurgery consulted in the ED.  Follow-up with their recommendations.  Consult oncology in the a.m.  Further work-up  including MRI brain/CT chest/A/P ordered.    AMS (altered mental status)  Assessment & Plan  Cassandra Bartlett is a 60 y.o. female who presents to the Emergency Department brought in by family out of concern for altered mental status for the last 4 to 6 weeks.     CTH:  IMPRESSION:  1.  Large bifrontal intracranial mass with vasogenic edema and associated mass effect. Mass likely originates in the right hemisphere and crosses the midline into the left hemisphere. Appearance most compatible with intracranial neoplasia. Recommend   follow-up evaluation with MRI of brain without and with contrast  2.  Right to left midline shift     Likely secondary to recent brain mass with mass-effect.     Neurosurgery-Dr. Arizmendi noted in the ED.  Oncological work-up include MRI brain/CT chest/A/P ordered.  Will need oncology consulted in a.m.  We will benefit from IV steroids due to AMS from mass-effect and vasogenic edema.  Will defer to neurosurgery.  Seizure precautions.  Fall precautions.    Tobacco abuse- (present on admission)  Assessment & Plan  Counseling provided.    Chronic midline low back pain without sciatica- (present on admission)  Assessment & Plan  Continue home medication-   gabapentin 300 mg twice daily.  Zanaflex 2 mg at bedtime as needed.         VTE prophylaxis: Lovenox

## 2021-03-04 NOTE — CARE PLAN
Problem: Communication  Goal: The ability to communicate needs accurately and effectively will improve  Outcome: PROGRESSING AS EXPECTED  Note: Educated pt on POC, medications, and answered any questions the pt had. Reinforced the use of the call light. Encouraged pt to voice any concerns or needs. Will continue to monitor.        Problem: Safety  Goal: Will remain free from injury  Outcome: PROGRESSING AS EXPECTED  Note: Educated pt on POC and fall risk. Assessed pts understanding of using call light for assistance. Fall precautions in place. Call light within reach, appropriate signage placed, treaded socks and bed alarm on.

## 2021-03-04 NOTE — PROGRESS NOTES
Monitor summary: SR, 74-84, CA 0.18, QRS 0.08, QT 0.36, with rare PACs per strip from monitor room.

## 2021-03-04 NOTE — ED TRIAGE NOTES
Cassandra Bartlett  60 y.o. female  Chief Complaint   Patient presents with   • Altered Mental Status     Onset x 1 month with exacerbation over the last three months. Pt has become incontinent at night. Pt states states she is not altered. Pt's grand-daughter states her cognition has diminished greatly.       Pt ambulatory to triage with steady gait for above complaint.   Pt is alert and oriented, speaking in full sentences, follows commands and responds appropriately to questions. Resp are even and unlabored. No behavioral indicators of pain.   Pt placed in lobby. Pt educated on triage process. Pt encouraged to alert staff for any changes. This RN masked and in appropriate PPE during encounter.

## 2021-03-04 NOTE — CARE PLAN
Problem: Communication  Goal: The ability to communicate needs accurately and effectively will improve  Outcome: PROGRESSING AS EXPECTED     Problem: Safety  Goal: Will remain free from injury  Outcome: PROGRESSING AS EXPECTED  Intervention: Provide assistance with mobility  Flowsheets (Taken 3/4/2021 1126)  Ambulation Tolerance:   General Weakness   Tolerates Well  Note: Assisted pt up to bathroom, free from fall     Problem: Venous Thromboembolism (VTW)/Deep Vein Thrombosis (DVT) Prevention:  Goal: Patient will participate in Venous Thrombosis (VTE)/Deep Vein Thrombosis (DVT)Prevention Measures  Outcome: PROGRESSING AS EXPECTED  Intervention: Assess and monitor for anticoagulation complications  Note: Lovenox implemented, no s/s of complications

## 2021-03-04 NOTE — DIETARY
"Nutrition services: Day 1 of admit.  Cassandra Bartlett is a 60 y.o. female with admitting DX of brain mass.  Consult received for low BMI, high risk diagnosis (head/neck cancer).    Spoke with patient and  at bedside. Patient visualized eating breakfast, reports good appetite this morning. Patient states usual body weight of 120 lb approximately one month ago- see evaluation below, unable to verify pt report per chart wt history.      explains pt has been intermittently forgetting to eat or missing breakfast/lunch meals due to sleeping in over the past 2-3 weeks.  reports these incidents have not been consistent and that when she does eat she generally \"cleans her plate\".     Patient and  suspect she will return home today pending test results; however, were agreeable to Boost supplements to bolster intake of kcal/protein during admission - will add these as PM snack.     Assessment:  Height: 170.2 cm (5' 7\")  Weight: 50 kg (110 lb 3.7 oz) via unknown scale 3/3.  Body mass index is 17.26 kg/m²., BMI classification: underweight  Diet/Intake: Regular. PO % x 1 meal.     Evaluation:   1. PMH: glioblastoma, tobacco use  2. Pt noted with slight hollowing of buccal region, dark/hollowed orbital region suggestive of mild to moderate fat wasting per visual inspection. Pt actively eating breakfast, did not perform complete nutrition focused physical exam at this time.   3. Weight history per chart review: 4.9% wt loss x 9 mo is not clinically significant  · 52.6 kg (116 lb) 6/15/20  4. MAR: NS @ 83 mL/h  5. Labs: glu 158 (H- no history DM per chart, no A1c to assess), BUN 23 (H), AST 10 (L)  6. Last bowel movement prior to admission.   7. Pt pending oncology consult, brain MRI/chest CT per hospitalist note.     Malnutrition Risk: Criteria not met at this time.    Recommendations/Plan:  1. Start qd Boost supplement at PM snack per poor PO protocol.   2. Follow up for full nutrition focused " physical exam as able.   3. Encourage intake of meals and supplements.  4. Document intake of all meals and supplements as % taken in ADL's to provide interdisciplinary communication across all shifts.   5. Monitor weight.  6. Nutrition rep will continue to see patient for ongoing meal and snack preferences.     RD following.

## 2021-03-04 NOTE — ASSESSMENT & PLAN NOTE
Cassandra Bartlett is a 60 y.o. female who presents to the Emergency Department brought in by family out of concern for altered mental status for the last 4 to 6 weeks.     CTH:  IMPRESSION:  1.  Large bifrontal intracranial mass with vasogenic edema and associated mass effect. Mass likely originates in the right hemisphere and crosses the midline into the left hemisphere. Appearance most compatible with intracranial neoplasia. Recommend   follow-up evaluation with MRI of brain without and with contrast  2.  Right to left midline shift     Likely secondary to recent brain mass with mass-effect.     Neurosurgery-Dr. Arizmendi noted in the ED.  Oncological work-up include MRI brain/CT chest/A/P ordered.  Will need oncology consulted in a.m.  We will benefit from IV steroids due to AMS from mass-effect and vasogenic edema.  Will defer to neurosurgery.  Seizure precautions.  Fall precautions.

## 2021-03-04 NOTE — H&P
Hospital Medicine History & Physical Note    Date of Service  3/3/2021    Primary Care Physician  PA Carlos.    Consultants  Neurosurgery    Code Status  Full Code    Chief Complaint  Chief Complaint   Patient presents with   • Altered Mental Status     Onset x 1 month with exacerbation over the last three months. Pt has become incontinent at night. Pt states states she is not altered. Pt's grand-daughter states her cognition has diminished greatly.       History of Presenting Illness  Cassandra Bartlett is a 60 y.o. female who presents to the Emergency Department brought in by family for evaluation for altered mental status for the last 4 to 6 weeks.    She is accompanied by her  who says that she is simply not herself. She has had episodes of generalized weakness, apathy, forgetfulness, intermittent confusion and sometimes very low energy to get out of bed. She also had poor p.o. intake, dehydration and sometimes forgetting to eat her meals. He reports that this is an abrupt activity changed over the course of 4 to 6 weeks.   She has not had fevers or chills, nausea or vomiting, chest pain or cough or shortness of breath or dysuria. She denies any recreational drugs. They have been in communication with her primary care provider, and for some reasons, CT of the chest was performed yesterday, which revealed several lung nodules.     CTH in the emergency room revealed a large bifrontal cranial mass with vasogenic edema and associated mass-effect. Right to left midline shift. Findings concerning for glioblastoma multiforme. Neurosurgery-Dr. Arizmendi consulted in the ED.  Further oncology work-up including MRI of the brain, CT chest/A/P ordered.  She will be admitted to neuro/telemetry for further evaluation.    Review of Systems  Review of Systems   Constitutional: Positive for malaise/fatigue.   Eyes: Negative.    Respiratory: Negative.    Cardiovascular: Negative.    Gastrointestinal: Negative.     Musculoskeletal: Positive for falls.   Neurological: Positive for weakness and headaches.       Past Medical History   has a past medical history of Back pain and Chickenpox.    Surgical History   has a past surgical history that includes gyn surgery; pr breast augmentation with implant; laminotomy; arthroscopy, knee; and other.     Family History  family history includes Arthritis in her mother; Cancer in her father and sister; Diabetes in her mother; Heart Attack in her brother and brother; Hyperlipidemia in her mother; Prostate cancer in her brother; Psychiatric Illness in her mother.     Social History   reports that she has been smoking cigarettes. She has a 43.00 pack-year smoking history. She has never used smokeless tobacco. She reports that she does not drink alcohol and does not use drugs.    Allergies  Allergies   Allergen Reactions   • Codeine    • Omnicef [Cefdinir] Hives       Medications  Prior to Admission Medications   Prescriptions Last Dose Informant Patient Reported? Taking?   NON SPECIFIED 3/2/2021 at PM Patient Yes Yes   Sig: Take 2 Capsules by mouth every bedtime. OTC Kemal's leg cramp medication.   acetaminophen (TYLENOL) 500 MG Tab 3/2/2021 at PM Patient Yes Yes   Sig: Take 1,000 mg by mouth 2 times a day as needed.   famotidine (PEPCID) 20 MG Tab Not Taking at Unknown time Patient No No   Sig: Take 1 Tab by mouth 2 times a day.   Patient not taking: Reported on 3/3/2021   gabapentin (NEURONTIN) 300 MG Cap 3/2/2021 at 2100 Patient No No   Sig: Take 1 Cap by mouth every bedtime.   Patient taking differently: Take 300 mg by mouth. Pt takes one cap @ 1900 and one @ 2100   ibuprofen (MOTRIN) 600 MG Tab >7 days ago at unknown Patient Yes No   Sig: Take 600 mg by mouth every day.   tizanidine (ZANAFLEX) 2 MG capsule 3/2/2021 at PM Patient Yes No   Sig: Take 2 mg by mouth at bedtime as needed.      Facility-Administered Medications: None       Physical Exam  Temp:  [36.3 °C (97.4 °F)-36.7 °C (98  °F)] 36.7 °C (98 °F)  Pulse:  [73-92] 74  Resp:  [14-23] 22  BP: (115-126)/(56-72) 126/58  SpO2:  [92 %-96 %] 96 %    Physical Exam  Constitutional:       Appearance: Normal appearance.   HENT:      Head: Normocephalic and atraumatic.   Eyes:      Extraocular Movements: Extraocular movements intact.      Conjunctiva/sclera: Conjunctivae normal.   Cardiovascular:      Rate and Rhythm: Normal rate and regular rhythm.      Heart sounds: Normal heart sounds.   Pulmonary:      Effort: Pulmonary effort is normal.      Breath sounds: Normal breath sounds.   Abdominal:      General: Bowel sounds are normal.      Palpations: Abdomen is soft.   Musculoskeletal:         General: No swelling or tenderness.      Cervical back: Neck supple.   Skin:     General: Skin is warm.   Neurological:      General: No focal deficit present.      Mental Status: She is alert and oriented to person, place, and time.      Motor: Weakness present.      Gait: Gait abnormal.   Psychiatric:         Mood and Affect: Mood normal.         Behavior: Behavior normal.         Laboratory:  Recent Labs     03/03/21 2045   WBC 9.1   RBC 5.03   HEMOGLOBIN 15.1   HEMATOCRIT 43.7   MCV 86.9   MCH 30.0   MCHC 34.6   RDW 41.7   PLATELETCT 216   MPV 10.4     Recent Labs     03/03/21 2045   SODIUM 140   POTASSIUM 4.0   CHLORIDE 104   CO2 21   GLUCOSE 87   BUN 22   CREATININE 0.61   CALCIUM 9.7     Recent Labs     03/03/21 2045   ALTSGPT 5   ASTSGOT 12   ALKPHOSPHAT 45   TBILIRUBIN 0.4   GLUCOSE 87         No results for input(s): NTPROBNP in the last 72 hours.      No results for input(s): TROPONINT in the last 72 hours.    Imaging:  CT-HEAD W/O   Final Result         1.  Large bifrontal intracranial mass with vasogenic edema and associated mass effect. Mass likely originates in the right hemisphere and crosses the midline into the left hemisphere. Appearance most compatible with intracranial neoplasia. Recommend    follow-up evaluation with MRI of brain  without and with contrast   2.  Right to left midline shift      These findings were discussed with the patient's clinician, Tello Cai, on 3/3/2021 9:23 PM.      MR-BRAIN-WITH & W/O    (Results Pending)   CT-CHEST,ABDOMEN,PELVIS WITH    (Results Pending)         Assessment/Plan:  I anticipate this patient will require at least two midnights for appropriate medical management, necessitating inpatient admission.    * Brain mass  Assessment & Plan  CTH:  IMPRESSION:  1.  Large bifrontal intracranial mass with vasogenic edema and associated mass effect. Mass likely originates in the right hemisphere and crosses the midline into the left hemisphere. Appearance most compatible with intracranial neoplasia. Recommend   follow-up evaluation with MRI of brain without and with contrast  2.  Right to left midline shift    Concerning for GBM.  Neurosurgery consulted in the ED.  Follow-up with their recommendations.  Consult oncology in the a.m.  Further work-up including MRI brain/CT chest/A/P ordered.    AMS (altered mental status)  Assessment & Plan  Cassandra Bartlett is a 60 y.o. female who presents to the Emergency Department brought in by family out of concern for altered mental status for the last 4 to 6 weeks.     CTH:  IMPRESSION:  1.  Large bifrontal intracranial mass with vasogenic edema and associated mass effect. Mass likely originates in the right hemisphere and crosses the midline into the left hemisphere. Appearance most compatible with intracranial neoplasia. Recommend   follow-up evaluation with MRI of brain without and with contrast  2.  Right to left midline shift     Likely secondary to recent brain mass with mass-effect.     Neurosurgery-Dr. Arizmendi noted in the ED.  Oncological work-up include MRI brain/CT chest/A/P ordered.  Will need oncology consulted in a.m.  We will benefit from IV steroids due to AMS from mass-effect and vasogenic edema.  Will defer to neurosurgery.  Seizure  precautions.  Fall precautions.    Tobacco abuse- (present on admission)  Assessment & Plan  Counseling provided.    Chronic midline low back pain without sciatica- (present on admission)  Assessment & Plan  Continue home medication-   gabapentin 300 mg twice daily.  Zanaflex 2 mg at bedtime as needed.

## 2021-03-04 NOTE — PROGRESS NOTES
2 RN skin check completed with JERICA Taveras.     Skin fragile and intact.   Sacrum pink and blanching.   No skin issues noted.

## 2021-03-05 VITALS
HEART RATE: 75 BPM | HEIGHT: 67 IN | OXYGEN SATURATION: 96 % | TEMPERATURE: 97.4 F | SYSTOLIC BLOOD PRESSURE: 125 MMHG | BODY MASS INDEX: 17.3 KG/M2 | DIASTOLIC BLOOD PRESSURE: 59 MMHG | RESPIRATION RATE: 17 BRPM | WEIGHT: 110.23 LBS

## 2021-03-05 PROBLEM — G89.29 CHRONIC MIDLINE LOW BACK PAIN WITHOUT SCIATICA: Status: RESOLVED | Noted: 2017-11-06 | Resolved: 2021-03-05

## 2021-03-05 PROBLEM — Z72.0 TOBACCO ABUSE: Status: RESOLVED | Noted: 2017-11-06 | Resolved: 2021-03-05

## 2021-03-05 PROBLEM — R41.82 AMS (ALTERED MENTAL STATUS): Status: RESOLVED | Noted: 2021-03-03 | Resolved: 2021-03-05

## 2021-03-05 PROBLEM — M54.50 CHRONIC MIDLINE LOW BACK PAIN WITHOUT SCIATICA: Status: RESOLVED | Noted: 2017-11-06 | Resolved: 2021-03-05

## 2021-03-05 LAB
ALBUMIN SERPL BCP-MCNC: 3.5 G/DL (ref 3.2–4.9)
ALBUMIN/GLOB SERPL: 1.5 G/DL
ALP SERPL-CCNC: 40 U/L (ref 30–99)
ALT SERPL-CCNC: 5 U/L (ref 2–50)
ANION GAP SERPL CALC-SCNC: 10 MMOL/L (ref 7–16)
AST SERPL-CCNC: 8 U/L (ref 12–45)
BILIRUB SERPL-MCNC: 0.3 MG/DL (ref 0.1–1.5)
BUN SERPL-MCNC: 19 MG/DL (ref 8–22)
CALCIUM SERPL-MCNC: 8.7 MG/DL (ref 8.5–10.5)
CHLORIDE SERPL-SCNC: 104 MMOL/L (ref 96–112)
CO2 SERPL-SCNC: 22 MMOL/L (ref 20–33)
CREAT SERPL-MCNC: 0.47 MG/DL (ref 0.5–1.4)
ERYTHROCYTE [DISTWIDTH] IN BLOOD BY AUTOMATED COUNT: 41.2 FL (ref 35.9–50)
GLOBULIN SER CALC-MCNC: 2.4 G/DL (ref 1.9–3.5)
GLUCOSE SERPL-MCNC: 108 MG/DL (ref 65–99)
HCT VFR BLD AUTO: 39.1 % (ref 37–47)
HGB BLD-MCNC: 13.2 G/DL (ref 12–16)
MCH RBC QN AUTO: 29.3 PG (ref 27–33)
MCHC RBC AUTO-ENTMCNC: 33.8 G/DL (ref 33.6–35)
MCV RBC AUTO: 86.9 FL (ref 81.4–97.8)
PLATELET # BLD AUTO: 208 K/UL (ref 164–446)
PMV BLD AUTO: 10.9 FL (ref 9–12.9)
POTASSIUM SERPL-SCNC: 3.8 MMOL/L (ref 3.6–5.5)
PROT SERPL-MCNC: 5.9 G/DL (ref 6–8.2)
RBC # BLD AUTO: 4.5 M/UL (ref 4.2–5.4)
SODIUM SERPL-SCNC: 136 MMOL/L (ref 135–145)
WBC # BLD AUTO: 7.8 K/UL (ref 4.8–10.8)

## 2021-03-05 PROCEDURE — A9270 NON-COVERED ITEM OR SERVICE: HCPCS | Performed by: NEUROLOGICAL SURGERY

## 2021-03-05 PROCEDURE — A9270 NON-COVERED ITEM OR SERVICE: HCPCS | Performed by: STUDENT IN AN ORGANIZED HEALTH CARE EDUCATION/TRAINING PROGRAM

## 2021-03-05 PROCEDURE — 99239 HOSP IP/OBS DSCHRG MGMT >30: CPT | Performed by: STUDENT IN AN ORGANIZED HEALTH CARE EDUCATION/TRAINING PROGRAM

## 2021-03-05 PROCEDURE — 80053 COMPREHEN METABOLIC PANEL: CPT

## 2021-03-05 PROCEDURE — 700102 HCHG RX REV CODE 250 W/ 637 OVERRIDE(OP): Performed by: STUDENT IN AN ORGANIZED HEALTH CARE EDUCATION/TRAINING PROGRAM

## 2021-03-05 PROCEDURE — 700102 HCHG RX REV CODE 250 W/ 637 OVERRIDE(OP): Performed by: NEUROLOGICAL SURGERY

## 2021-03-05 PROCEDURE — 85027 COMPLETE CBC AUTOMATED: CPT

## 2021-03-05 PROCEDURE — 36415 COLL VENOUS BLD VENIPUNCTURE: CPT

## 2021-03-05 PROCEDURE — 700111 HCHG RX REV CODE 636 W/ 250 OVERRIDE (IP): Performed by: STUDENT IN AN ORGANIZED HEALTH CARE EDUCATION/TRAINING PROGRAM

## 2021-03-05 RX ORDER — GABAPENTIN 300 MG/1
300 CAPSULE ORAL 2 TIMES DAILY
Qty: 60 CAPSULE | Refills: 3 | Status: SHIPPED | OUTPATIENT
Start: 2021-03-05

## 2021-03-05 RX ORDER — TIZANIDINE 2 MG/1
2 TABLET ORAL
Qty: 30 TABLET | Refills: 3 | Status: SHIPPED | OUTPATIENT
Start: 2021-03-05

## 2021-03-05 RX ORDER — LEVETIRACETAM 100 MG/ML
500 SOLUTION ORAL EVERY 12 HOURS
Status: DISCONTINUED | OUTPATIENT
Start: 2021-03-05 | End: 2021-03-05 | Stop reason: HOSPADM

## 2021-03-05 RX ORDER — TIZANIDINE 2 MG/1
2 TABLET ORAL
Qty: 30 TABLET | Refills: 3 | Status: SHIPPED | OUTPATIENT
Start: 2021-03-05 | End: 2021-03-05

## 2021-03-05 RX ORDER — LEVETIRACETAM 500 MG/1
500 TABLET ORAL 2 TIMES DAILY
Qty: 60 TABLET | Refills: 6 | Status: SHIPPED | OUTPATIENT
Start: 2021-03-05 | End: 2021-03-05

## 2021-03-05 RX ORDER — DEXAMETHASONE 6 MG/1
6 TABLET ORAL EVERY 6 HOURS
Status: DISCONTINUED | OUTPATIENT
Start: 2021-03-05 | End: 2021-03-05 | Stop reason: HOSPADM

## 2021-03-05 RX ORDER — LEVETIRACETAM 500 MG/1
500 TABLET ORAL 2 TIMES DAILY
Qty: 62 TABLET | Refills: 0 | Status: SHIPPED
Start: 2021-03-05 | End: 2021-03-05 | Stop reason: SDUPTHER

## 2021-03-05 RX ORDER — LEVETIRACETAM 500 MG/1
500 TABLET ORAL 2 TIMES DAILY
Qty: 60 TABLET | Refills: 3 | Status: SHIPPED | OUTPATIENT
Start: 2021-03-05

## 2021-03-05 RX ADMIN — GABAPENTIN 300 MG: 300 CAPSULE ORAL at 05:34

## 2021-03-05 RX ADMIN — DEXAMETHASONE 6 MG: 6 TABLET ORAL at 17:25

## 2021-03-05 RX ADMIN — ENOXAPARIN SODIUM 40 MG: 40 INJECTION SUBCUTANEOUS at 05:34

## 2021-03-05 ASSESSMENT — ENCOUNTER SYMPTOMS
EYES NEGATIVE: 1
CARDIOVASCULAR NEGATIVE: 1
GASTROINTESTINAL NEGATIVE: 1
CONSTITUTIONAL NEGATIVE: 1
MUSCULOSKELETAL NEGATIVE: 1
RESPIRATORY NEGATIVE: 1
PSYCHIATRIC NEGATIVE: 1
NEUROLOGICAL NEGATIVE: 1

## 2021-03-05 ASSESSMENT — PAIN DESCRIPTION - PAIN TYPE: TYPE: ACUTE PAIN

## 2021-03-05 NOTE — PROGRESS NOTES
Removed patient jewelry (silver metal rings) and sent home with  as she needed to have them removed for MRI.

## 2021-03-05 NOTE — PROGRESS NOTES
[facility] Medicine Daily Progress Note    Date of Service  3/5/2021    Chief Complaint  60 y.o. female with PMHx of Peripheral Neuropathy admitted 3/3/2021 with Altered Mental Status.    Hospital Course  Cassandra Bartlett is a 60 y.o. female who presents to the Emergency Department brought in by family for evaluation for altered mental status for the last 4 to 6 weeks. She is accompanied by her  who says that she is simply not herself. She has had episodes of generalized weakness, apathy, forgetfulness, intermittent confusion and sometimes very low energy to get out of bed. She also had poor p.o. intake, dehydration and sometimes forgetting to eat her meals. He reports that this is an abrupt activity changed over the course of 4 to 6 weeks. She has not had fevers or chills, nausea or vomiting, chest pain or cough or shortness of breath or dysuria. She denies any recreational drugs. They have been in communication with her primary care provider, and for some reasons, CT of the chest was performed yesterday, which revealed several lung nodules.      CTH in the emergency room revealed a large bifrontal cranial mass with vasogenic edema and associated mass-effect. Right to left midline shift. Findings concerning for glioblastoma multiforme. Neurosurgery-Dr. Arizmendi consulted in the ED.  Further oncology work-up including MRI of the brain, CT chest/A/P ordered.  She will be admitted to neuro/telemetry for further evaluation.    Interval Problem Update  Patient evaluated at bedside  In no acute distress  No overnight complaints  Follow up Neurosurgery Dr. 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    Follow up NSx Recommendations      Consultants/Specialty  Neurosurgery-Dr. Arizmendi    Code  Status  Full Code    Disposition  TBD    Review of Systems  Review of Systems   Constitutional: Negative.    HENT: Negative.    Eyes: Negative.    Respiratory: Negative.    Cardiovascular: Negative.    Gastrointestinal: Negative.    Genitourinary: Negative.    Musculoskeletal: Negative.    Skin: Negative.    Neurological: Negative.    Endo/Heme/Allergies: Negative.    Psychiatric/Behavioral: Negative.         Physical Exam  Temp:  [36.3 °C (97.4 °F)-37.2 °C (98.9 °F)] 37.2 °C (98.9 °F)  Pulse:  [65-81] 81  Resp:  [14-18] 14  BP: (126-139)/(69-71) 133/70  SpO2:  [95 %-96 %] 95 %    Physical Exam  Vitals reviewed.   HENT:      Head: Normocephalic and atraumatic.      Right Ear: External ear normal.      Left Ear: External ear normal.      Nose: Nose normal.      Mouth/Throat:      Mouth: Mucous membranes are moist.   Eyes:      Pupils: Pupils are equal, round, and reactive to light.   Cardiovascular:      Rate and Rhythm: Normal rate and regular rhythm.      Pulses: Normal pulses.      Heart sounds: Normal heart sounds.   Pulmonary:      Effort: Pulmonary effort is normal.      Breath sounds: Normal breath sounds.   Abdominal:      General: Abdomen is flat.   Musculoskeletal:         General: Normal range of motion.      Cervical back: Neck supple.   Skin:     General: Skin is warm.      Capillary Refill: Capillary refill takes less than 2 seconds.   Neurological:      General: No focal deficit present.      Mental Status: She is alert.   Psychiatric:         Mood and Affect: Mood normal.         Fluids    Intake/Output Summary (Last 24 hours) at 3/5/2021 0826  Last data filed at 3/4/2021 2045  Gross per 24 hour   Intake 166 ml   Output 250 ml   Net -84 ml       Laboratory  Recent Labs     03/03/21 2045 03/04/21  0052 03/05/21  0232   WBC 9.1 8.5 7.8   RBC 5.03 4.50 4.50   HEMOGLOBIN 15.1 13.4 13.2   HEMATOCRIT 43.7 39.1 39.1   MCV 86.9 86.9 86.9   MCH 30.0 29.8 29.3   MCHC 34.6 34.3 33.8   RDW 41.7 41.4 41.2    PLATELETCT 216 206 208   MPV 10.4 10.6 10.9     Recent Labs     03/03/21  2045 03/04/21  0052 03/05/21  0232   SODIUM 140 138 136   POTASSIUM 4.0 3.8 3.8   CHLORIDE 104 103 104   CO2 21 23 22   GLUCOSE 87 158* 108*   BUN 22 23* 19   CREATININE 0.61 0.61 0.47*   CALCIUM 9.7 9.1 8.7                   Imaging  MR-BRAIN-WITH & W/O   Final Result      Large approximate 8.3 x 5.5 x 5.1 cm heterogeneous bifrontal complex mass with areas of necrosis and hemorrhage and moderate surrounding edema 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 See details above.      CT-CHEST,ABDOMEN,PELVIS WITH   Final Result         1.  No acute abnormality.   2.  Left nephrolithiasis without visualized obstructive changes.   3.  Hepatomegaly   4.  Atherosclerosis and atherosclerotic coronary artery disease.   5.  Multiple numerous bilateral subcentimeter pulmonary nodules measuring up to 5.8 mm.      Fleischner Society pulmonary nodule recommendations:      Low Risk: No routine follow-up      High Risk: Optional CT at 12 months      Comments: Use most suspicious nodule as guide to management. Follow-up intervals may vary according to size and risk.      Low Risk - Minimal or absent history of smoking and of other known risk factors.      High Risk - History of smoking or of other known risk factors.      Note: These recommendations do not apply to lung cancer screening, patients with immunosuppression, or patients with known primary cancer.      Fleischner Society 2017 Guidelines for Management of Incidentally Detected Pulmonary Nodules in Adults      CT-HEAD W/O   Final Result         1.  Large bifrontal intracranial mass with vasogenic edema and associated mass effect. Mass likely originates in the right hemisphere and crosses the midline into the left hemisphere. Appearance most compatible with intracranial neoplasia. Recommend    follow-up evaluation with MRI of brain without and with contrast   2.  Right to left midline shift       These findings were discussed with the patient's clinician, Tello Cai, on 3/3/2021 9:23 PM.           Assessment/Plan  * Brain mass  Assessment & Plan  CTH:  IMPRESSION:  1.  Large bifrontal intracranial mass with vasogenic edema and associated mass effect. Mass likely originates in the right hemisphere and crosses the midline into the left hemisphere. Appearance most compatible with intracranial neoplasia. Recommend   follow-up evaluation with MRI of brain without and with contrast  2.  Right to left midline shift    Concerning for GBM.  Neurosurgery consulted in the ED.  Follow-up with their recommendations.  Consult oncology in the a.m.  Further work-up including MRI brain/CT chest/A/P ordered.    AMS (altered mental status)  Assessment & Plan  Cassandra Bartlett is a 60 y.o. female who presents to the Emergency Department brought in by family out of concern for altered mental status for the last 4 to 6 weeks.     CTH:  IMPRESSION:  1.  Large bifrontal intracranial mass with vasogenic edema and associated mass effect. Mass likely originates in the right hemisphere and crosses the midline into the left hemisphere. Appearance most compatible with intracranial neoplasia. Recommend   follow-up evaluation with MRI of brain without and with contrast  2.  Right to left midline shift     Likely secondary to recent brain mass with mass-effect.     Neurosurgery-Dr. Arizmendi noted in the ED.  Oncological work-up include MRI brain/CT chest/A/P ordered.  Will need oncology consulted in a.m.  We will benefit from IV steroids due to AMS from mass-effect and vasogenic edema.  Will defer to neurosurgery.  Seizure precautions.  Fall precautions.    Tobacco abuse- (present on admission)  Assessment & Plan  Counseling provided.    Chronic midline low back pain without sciatica- (present on admission)  Assessment & Plan  Continue home medication-   gabapentin 300 mg twice daily.  Zanaflex 2 mg at bedtime as needed.        VTE prophylaxis: Lovenox

## 2021-03-05 NOTE — PROGRESS NOTES
Notified Dr. Camacho of pt's increased lethargy. Pt received gabapentin this am (has ordered twice a day); per pt's  gabapentin makes pt very sleepy at home.  does not want pt to receive gabapentin TID. Per Dr. Camacho - hold next dose.

## 2021-03-05 NOTE — CARE PLAN
Problem: Safety  Goal: Will remain free from injury  Outcome: PROGRESSING AS EXPECTED  Bed locked and in lowest position.  Bed alarm on.  Treaded socks.  Call light and belongings with in reach.  Pt educated to call for assistance. Pt verbalized understanding.  Hourly rounding in place.        Problem: Knowledge Deficit  Goal: Knowledge of disease process/condition, treatment plan, diagnostic tests, and medications will improve  Outcome: PROGRESSING AS EXPECTED  Discussed POC and medications with patient.  Patient verbalized understanding.

## 2021-03-05 NOTE — PROGRESS NOTES
Monitor summary: SR 64-72, IA 0.20, QRS 0.08, QT 0.40, with rare PACs per strip from monitor room.

## 2021-03-05 NOTE — PROGRESS NOTES
Neurosurgery Progress Note    Subjective:  Very sleepy, arouses to voice. Difficult to participate in exam   at bedside     Exam:  AAOx3, drowsy  FAC  OCHOA  Finger nose intact, -drift  Sensation intact  Voiding  Eating     BP  Min: 126/71  Max: 139/69  Pulse  Av.7  Min: 65  Max: 81  Resp  Av.3  Min: 14  Max: 15  Temp  Av.9 °C (98.5 °F)  Min: 36.7 °C (98.1 °F)  Max: 37.2 °C (98.9 °F)  SpO2  Av.3 %  Min: 95 %  Max: 96 %    No data recorded    Recent Labs     21  0232   WBC 9.1 8.5 7.8   RBC 5.03 4.50 4.50   HEMOGLOBIN 15.1 13.4 13.2   HEMATOCRIT 43.7 39.1 39.1   MCV 86.9 86.9 86.9   MCH 30.0 29.8 29.3   MCHC 34.6 34.3 33.8   RDW 41.7 41.4 41.2   PLATELETCT 216 206 208   MPV 10.4 10.6 10.9     Recent Labs     21  0232   SODIUM 140 138 136   POTASSIUM 4.0 3.8 3.8   CHLORIDE 104 103 104   CO2 21 23 22   GLUCOSE 87 158* 108*   BUN 22 23* 19   CREATININE 0.61 0.61 0.47*   CALCIUM 9.7 9.1 8.7               Intake/Output       21 - 21 - 21 0659       Total  Total       Intake    I.V.  863.2  -- 863.2  --  -- --    Volume (mL) (NS infusion) 863.2 -- 863.2 -- -- --    Total Intake 863.2 -- 863.2 -- -- --       Output    Urine  --  250 250  --  -- --    Number of Times Voided 1 x 2 x 3 x -- -- --    Number of Times Incontinent of Urine 1 x 1 x 2 x -- -- --    Urine Void (mL) -- 250 250 -- -- --    Stool  --  -- --  --  -- --    Number of Times Stooled 1 x -- 1 x -- -- --    Total Output -- 250 250 -- -- --       Net I/O     863.2 -250 613.2 -- -- --            Intake/Output Summary (Last 24 hours) at 3/5/2021 0901  Last data filed at 3/4/2021 2045  Gross per 24 hour   Intake 166 ml   Output 250 ml   Net -84 ml            • gabapentin  300 mg BID   • senna-docusate  2 tablet BID    And   • polyethylene glycol/lytes  1 Packet QDAY PRN    And   • magnesium  hydroxide  30 mL QDAY PRN    And   • bisacodyl  10 mg QDAY PRN   • NS   Continuous   • enoxaparin  40 mg DAILY   • acetaminophen  650 mg Q6HRS PRN   • ondansetron  4 mg Q4HRS PRN   • ondansetron  4 mg Q4HRS PRN   • promethazine  12.5-25 mg Q4HRS PRN   • promethazine  12.5-25 mg Q4HRS PRN   • prochlorperazine  5-10 mg Q4HRS PRN   • tizanidine  2 mg QHS PRN       Assessment and Plan:  Hospital day #2  POD #NA   Prophylactic anticoagulation: Lovenox         Start date/time: 3/3/2021    Plan:  Stable   MRI Brain, Dr. Arizmendi will review today  Keep eunatremic     Dr. Arizmendi in surgery, will stop by later today to discuss MRI finding with patient and .

## 2021-03-05 NOTE — DISCHARGE SUMMARY
Discharge Summary    CHIEF COMPLAINT ON ADMISSION  Chief Complaint   Patient presents with   • Altered Mental Status     Onset x 1 month with exacerbation over the last three months. Pt has become incontinent at night. Pt states states she is not altered. Pt's grand-daughter states her cognition has diminished greatly.       Reason for Admission  Sent by MD; Altered Mental Status     Admission Date  3/3/2021    CODE STATUS  Full Code    HPI & HOSPITAL COURSE  Cassandra Bartlett is a 60 y.o. female who presents to the Emergency Department brought in by family for evaluation for altered mental status for the last 4 to 6 weeks. She is accompanied by her  who says that she is simply not herself. She has had episodes of generalized weakness, apathy, forgetfulness, intermittent confusion and sometimes very low energy to get out of bed. She also had poor p.o. intake, dehydration and sometimes forgetting to eat her meals. He reports that this is an abrupt activity changed over the course of 4 to 6 weeks. She has not had fevers or chills, nausea or vomiting, chest pain or cough or shortness of breath or dysuria. She denies any recreational drugs. They have been in communication with her primary care provider, and for some reasons, CT of the chest was performed yesterday, which revealed several lung nodules.      CTH in the emergency room revealed a large bifrontal cranial mass with vasogenic edema and associated mass-effect. Right to left midline shift. Findings concerning for glioblastoma multiforme. Neurosurgery-Dr. Arizmendi consulted in the ED.  Further oncology work-up including MRI of the brain, CT chest/A/P ordered.    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     who is medical decision maker has opted for Home Hospice          Therefore, she  is discharged in good and stable condition to hospice.    The patient met 2-midnight criteria for an inpatient stay at the time of discharge.    Discharge Date  3/5/21    FOLLOW UP ITEMS POST DISCHARGE  None    DISCHARGE DIAGNOSES  Principal Problem:    Brain mass POA: Unknown  Resolved Problems:    Chronic midline low back pain without sciatica POA: Yes    Tobacco abuse POA: Yes    AMS (altered mental status) POA: Unknown      FOLLOW UP  Future Appointments   Date Time Provider Department Center   3/5/2021  2:30 PM GARRY Carlos [address]     No follow-up provider specified.    MEDICATIONS ON DISCHARGE     Medication List      START taking these medications      Instructions   tizanidine 2 MG tablet  Commonly known as: ZANAFLEX  Replaces: tizanidine 2 MG capsule   Take 1 tablet by mouth at bedtime as needed.  Dose: 2 mg        CHANGE how you take these medications      Instructions   gabapentin 300 MG Caps  What changed: when to take this  Commonly known as: NEURONTIN   Take 1 capsule by mouth 2 Times a Day.  Dose: 300 mg        CONTINUE taking these medications      Instructions   acetaminophen 500 MG Tabs  Commonly known as: TYLENOL   Take 1,000 mg by mouth 2 times a day as needed.  Dose: 1,000 mg     ibuprofen 600 MG Tabs  Commonly known as: MOTRIN   Take 600 mg by mouth every day.  Dose: 600 mg        STOP taking these medications    famotidine 20 MG Tabs  Commonly known as: PEPCID     NON SPECIFIED     tizanidine 2 MG capsule  Commonly known as: ZANAFLEX  Replaced by: tizanidine 2 MG tablet            Allergies  Allergies   Allergen Reactions   • Codeine    • Omnicef [Cefdinir] Hives       DIET  Orders Placed This Encounter   Procedures   • Diet Order Diet: Regular     Standing Status:   Standing     Number of Occurrences:   1     Order Specific Question:   Diet:     Answer:   Regular [1]       ACTIVITY  As tolerated.  Weight bearing as tolerated    CONSULTATIONS  Neurosurgery-  [facility]    PROCEDURES  None    LABORATORY  Lab Results   Component Value Date    SODIUM 136 03/05/2021    POTASSIUM 3.8 03/05/2021    CHLORIDE 104 03/05/2021    CO2 22 03/05/2021    GLUCOSE 108 (H) 03/05/2021    BUN 19 03/05/2021    CREATININE 0.47 (L) 03/05/2021        Lab Results   Component Value Date    WBC 7.8 03/05/2021    HEMOGLOBIN 13.2 03/05/2021    HEMATOCRIT 39.1 03/05/2021    PLATELETCT 208 03/05/2021        Total time of the discharge process exceeds 35 minutes.

## 2021-03-06 NOTE — DISCHARGE PLANNING
Received Choice form at 0728  Agency/Facility Name: Hospice - 1) [facility], 2) [facility]  Referral sent per Choice form @ [phone]

## 2021-03-06 NOTE — DISCHARGE PLANNING
Anticipated Discharge Disposition:   Home   Hospice    Action:    Pt admitted with brain mass, altered mental status.    Pt resting.  Met with spouse Tr and daughter outside of patient's room.  They would like to take patient home today.   Explained hospice. They provided consent to send referrals to [facility] and [facility].  Choice form faxed to [facility].  Tr and daughter stated that they have a lot of support and feel comfortable taking patient home today and will be able to care for her.  Informed them that the hospice agency that accepts will reach out to Tr today or tomorrow.    Barriers to Discharge:    None    Plan:    F/U on referrals.  DC today.    Care Transition Team Assessment    Information Source  Orientation : Unable to Assess  Information Given By: Spouse  Informant's Name: Mohinder Bartlett  Who is responsible for making decisions for patient? : Legal next of kin    Readmission Evaluation  Is this a readmission?: No    Elopement Risk  Legal Hold: No  Ambulatory or Self Mobile in Wheelchair: No-Not an Elopement Risk  Disoriented: Time-At Risk for Elopement, Situation-At Risk for Elopement  Psychiatric Symptoms: None  History of Wandering: No  Elopement this Admit: No  Vocalizing Wanting to Leave: No  Displays Behaviors, Body Language Wanting to Leave: No-Not at Risk for Elopement  Elopement Risk: Not at Risk for Elopement  Wanderguard On: Unavailable  Personal Belongings: Hospital Clothing Only    Interdisciplinary Discharge Planning  Lives with - Patient's Self Care Capacity: Spouse(pts mother)  Patient or legal guardian wants to designate a caregiver: No  Housing / Facility: [address] [facility]""  Prior Services: Home-Independent    Discharge Preparedness  What is your plan after discharge?: Other (comment)  What are your discharge supports?: Child, Spouse  Prior Functional Level: Ambulatory, Needs Assist with Activities of Daily Living, Needs Assist with Medication Management  Difficulity with  ADLs: Bathing, Brushing teeth, Dressing, Eating, Toileting, Walking  Difficulity with IADLs: Cooking, Driving, Keeping track of finances, Laundry, Managing medication, Shopping, Using the telephone or computer    Functional Assesment  Prior Functional Level: Ambulatory, Needs Assist with Activities of Daily Living, Needs Assist with Medication Management    Finances  Financial Barriers to Discharge: No  Prescription Coverage: Yes    Vision / Hearing Impairment  Vision Impairment : Yes  Right Eye Vision: Impaired, Wears Glasses  Left Eye Vision: Impaired, Wears Glasses  Hearing Impairment : No         Advance Directive  Advance Directive?: None    Domestic Abuse  Have you ever been the victim of abuse or violence?: No         Discharge Risks or Barriers  Discharge risks or barriers?: No    Anticipated Discharge Information  Discharge Disposition: D/T to hospice home (50)  Discharge Address: [address] [place] [address]. [facility]  [id number]  Discharge Contact Phone Number: [phone]

## 2021-03-06 NOTE — DISCHARGE INSTRUCTIONS
Discharge Instructions    Discharged to home by car with relative. Discharged via wheelchair, hospital escort: Yes.  Special equipment needed: Not Applicable    Be sure to schedule a follow-up appointment with your primary care doctor or any specialists as instructed.     Discharge Plan:   Diet Plan: Discussed  Activity Level: Discussed  Confirmed Follow up Appointment: No (Comments)  Confirmed Symptoms Management: Discussed  Medication Reconciliation Updated: Yes  Influenza Vaccine Indication: Patient Refuses    I understand that a diet low in cholesterol, fat, and sodium is recommended for good health. Unless I have been given specific instructions below for another diet, I accept this instruction as my diet prescription.   Other diet: regular    Special Instructions: None      Depression / Suicide Risk    As you are discharged from this [facility] facility, it is important to learn how to keep safe from harming yourself.    Recognize the warning signs:  · Abrupt changes in personality, positive or negative- including increase in energy   · Giving away possessions  · Change in eating patterns- significant weight changes-  positive or negative  · Change in sleeping patterns- unable to sleep or sleeping all the time   · Unwillingness or inability to communicate  · Depression  · Unusual sadness, discouragement and loneliness  · Talk of wanting to die  · Neglect of personal appearance   · Rebelliousness- reckless behavior  · Withdrawal from people/activities they love  · Confusion- inability to concentrate     If you or a loved one observes any of these behaviors or has concerns about self-harm, here's what you can do:  · Talk about it- your feelings and reasons for harming yourself  · Remove any means that you might use to hurt yourself (examples: pills, rope, extension cords, firearm)  · Get professional help from the community (Mental Health, Substance Abuse, psychological counseling)  · Do not be alone:Call  your Safe Contact- someone whom you trust who will be there for you.  · Call your local CRISIS HOTLINE 669-2849 or 682-891-2805  · Call your local Children's Mobile Crisis Response Team [place] (006) 947-0742 or www.[employer]  · Call the toll free National Suicide Prevention Hotlines   · National Suicide Prevention Lifeline 307-081-AHFA (6375)  · National Hope Line Network 800-SUICIDE (958-8666)      Hospice  Hospice is a service that is designed to provide people who are terminally ill and their families with medical, spiritual, and psychological support. Its aim is to improve your quality of life by keeping you as comfortable as possible in the final stages of life.  Who will be my providers when I begin hospice care?  Hospice teams often include:  · A nurse.  · A doctor. The hospice doctor will be available for your care, but you can include your regular doctor or nurse practitioner.  · A .  · A counselor.  · A  (such as a ).  · A dietitian.  · Therapists.  · Trained volunteers who can help with care.  What services does hospice provide?  Hospice services can vary depending on the center or organization. Generally, they include:  · Ways to keep you comfortable, such as:  ? Providing care in your home or in a home-like setting.  ? Working with your family and friends to help meet your needs.  ? Allowing you to enjoy the support of loved ones by receiving much of your basic care from family and friends.  · Pain relief and symptom management. The staff will supply all necessary medicines and equipment so that you can stay comfortable and alert enough to enjoy the company of your friends and family.  · Visits or care from a nurse and doctor. This may include 24-hour on-call services.  · Companionship when you are alone.  · Allowing you and your family to rest. Hospice staff may do light housekeeping, prepare meals, and run errands.  · Counseling. They will make sure  your emotional, spiritual, and social needs are being met, as well as those needs of your family members.  · Spiritual care. This will be individualized to meet your needs and your family's needs. It may involve:  ? Helping you and your family understand the dying process.  ? Helping you say goodbye to your family and friends.  ? Performing a specific Cheondoism ceremony or ritual.  · Massage.  · Nutrition therapy.  · Physical and occupational therapy.  · Short-term inpatient care, if something cannot be managed in the home.  · Art or music therapy.  · Bereavement support for grieving family members.  When should hospice care begin?  Most people who use hospice are believed to have less than 6 months to live.  · Your family and health care providers can help you decide when hospice services should begin.  · If you live longer than 6 months but your condition does not improve, your doctor may be able to approve you for continued hospice care.  · If your condition improves, you may discontinue the program.  What should I consider before selecting a program?  Most hospice programs are run by nonprofit, independent organizations. Some are affiliated with hospitals, nursing homes, or home health care agencies. Hospice programs can take place in your home or at a hospice center, hospital, or skilled nursing facility. When choosing a hospice program, ask the following questions:  · What services are available to me?  · What services will be offered to my loved ones?  · How involved will my loved ones be?  · How involved will my health care provider be?  · Who makes up the hospice care team? How are they trained or screened?  · How will my pain and symptoms be managed?  · If my circumstances change, can the services be provided in a different setting, such as my home or in the hospital?  · Is the program reviewed and licensed by the state or certified in some other way?  · What does it cost? Is it covered by insurance?  · If  I choose a hospice center or nursing home, where is the hospice center located? Is it convenient for family and friends?  · If I choose a hospice center or nursing home, can my family and friends visit any time?  · Will you provide emotional and spiritual support?  · Who can my family call with questions?  Where can I learn more about hospice?  You can learn about existing hospice programs in your area from your health care providers. You can also read more about hospice online. The websites of the following organizations have helpful information:  · National Hospice and Palliative Care Organization (NHPCO): www.nhpco.org  · National Association for Home Care & Hospice (NAHC): www.nahc.org  · [employer] [place] (HFA): www.hospicefoundation.org  · American Cancer Society (ACS): www.cancer.org  · Hospice Net: www.hospicenet.org  · [employer] [employer] [facility] [place] (VNAA): www.vnaa.org  You may also find more information by contacting the following agencies:  · A local agency on aging.  · Your local [facility] chapter.  · Your state's department of health or .  Summary  · Hospice is a service that is designed to provide people who are terminally ill and their families with medical, spiritual, and psychological support.  · Hospice aims to improve your quality of life by keeping you as comfortable as possible in the final stages of life.  · Hospice teams often include a doctor, nurse, , counselor, ,dietitian, therapists, and volunteers.  · Hospice care generally includes medicine for symptom management, visits from doctors and nurses, physical and occupational therapy, nutrition counseling, spiritual and emotional counseling, caregiver support, and bereavement support for grieving family members.  · Hospice programs can take place in your home or at a hospice center, hospital, or skilled nursing facility.  This information is not intended to replace advice given  to you by your health care provider. Make sure you discuss any questions you have with your health care provider.  Document Released: 04/05/2005 Document Revised: 11/30/2018 Document Reviewed: 01/09/2018  Elsevier Patient Education © 2020 [employer]

## 2021-03-15 DIAGNOSIS — Z23 NEED FOR VACCINATION: ICD-10-CM

## 2023-04-21 NOTE — RESPIRATORY CARE
COPD EDUCATION by COPD CLINICAL EDUCATOR  3/4/2021 at 6:47 AM by July Chavarria, RRT     Patient reviewed by COPD education team. Patient does not qualify for the COPD program.      COPD Assessment  COPD Clinical Specialists ONLY  COPD Education Initiated: No--Quick Screen, Cognitive and memory issues per H&P    Is this a COPD exacerbation patient?: No     Marely Soares asked for callback from [employer] or [employer]; was not willing to state reason for call.